# Patient Record
Sex: FEMALE | Race: WHITE | NOT HISPANIC OR LATINO | Employment: UNEMPLOYED | URBAN - METROPOLITAN AREA
[De-identification: names, ages, dates, MRNs, and addresses within clinical notes are randomized per-mention and may not be internally consistent; named-entity substitution may affect disease eponyms.]

---

## 2018-05-24 ENCOUNTER — APPOINTMENT (EMERGENCY)
Dept: CT IMAGING | Facility: HOSPITAL | Age: 33
End: 2018-05-24
Payer: COMMERCIAL

## 2018-05-24 ENCOUNTER — HOSPITAL ENCOUNTER (EMERGENCY)
Facility: HOSPITAL | Age: 33
Discharge: HOME | End: 2018-05-24
Attending: EMERGENCY MEDICINE | Admitting: EMERGENCY MEDICINE
Payer: COMMERCIAL

## 2018-05-24 VITALS
OXYGEN SATURATION: 98 % | WEIGHT: 170 LBS | TEMPERATURE: 97.4 F | BODY MASS INDEX: 30.12 KG/M2 | HEART RATE: 107 BPM | HEIGHT: 63 IN | SYSTOLIC BLOOD PRESSURE: 119 MMHG | DIASTOLIC BLOOD PRESSURE: 57 MMHG | RESPIRATION RATE: 18 BRPM

## 2018-05-24 DIAGNOSIS — S13.9XXA CERVICAL SPRAIN: ICD-10-CM

## 2018-05-24 DIAGNOSIS — S09.90XA: Primary | ICD-10-CM

## 2018-05-24 PROCEDURE — 99284 EMERGENCY DEPT VISIT MOD MDM: CPT

## 2018-05-24 PROCEDURE — 70450 CT HEAD/BRAIN W/O DYE: CPT

## 2018-05-24 PROCEDURE — 72125 CT NECK SPINE W/O DYE: CPT

## 2018-05-24 RX ORDER — CYCLOBENZAPRINE HCL 10 MG
10 TABLET ORAL 2 TIMES DAILY PRN
Qty: 20 TABLET | Refills: 0 | Status: SHIPPED | OUTPATIENT
Start: 2018-05-24

## 2018-05-24 RX ORDER — IBUPROFEN 600 MG/1
600 TABLET ORAL EVERY 8 HOURS PRN
Qty: 30 TABLET | Refills: 0 | Status: SHIPPED | OUTPATIENT
Start: 2018-05-24

## 2018-05-24 NOTE — DISCHARGE INSTRUCTIONS
Head Injury   WHAT YOU NEED TO KNOW:   A head injury is most often caused by a blow to the head  This may occur from a fall, bicycle injury, sports injury, being struck in the head, or a motor vehicle accident  DISCHARGE INSTRUCTIONS:   Call 911 or have someone else call for any of the following:   · You cannot be woken  · You have a seizure  · You stop responding to others or you faint  · You have blurry or double vision  · Your speech becomes slurred or confused  · You have arm or leg weakness, loss of feeling, or new problems with coordination  · Your pupils are larger than usual or one pupil is a different size than the other  · You have blood or clear fluid coming out of your ears or nose  Return to the emergency department if:   · You have repeated or forceful vomiting  · You feel confused  · Your headache gets worse or becomes severe  · You or someone caring for you notices that you are harder to wake than usual   Contact your healthcare provider if:   · Your symptoms last longer than 6 weeks after the injury  · You have questions or concerns about your condition or care  Medicines:   · Acetaminophen  decreases pain  Acetaminophen is available without a doctor's order  Ask how much to take and how often to take it  Follow directions  Acetaminophen can cause liver damage if not taken correctly  · Take your medicine as directed  Contact your healthcare provider if you think your medicine is not helping or if you have side effects  Tell him or her if you are allergic to any medicine  Keep a list of the medicines, vitamins, and herbs you take  Include the amounts, and when and why you take them  Bring the list or the pill bottles to follow-up visits  Carry your medicine list with you in case of an emergency  Self-care:   · Rest  or do quiet activities for 24 to 48 hours  Limit your time watching TV, using the computer, or doing tasks that require a lot of thinking  Slowly return to your normal activities as directed  Do not play sports or do activities that may cause you to get hit in the head  Ask your healthcare provider when you can return to sports  · Apply ice  on your head for 15 to 20 minutes every hour or as directed  Use an ice pack, or put crushed ice in a plastic bag  Cover it with a towel before you apply it to your skin  Ice helps prevent tissue damage and decreases swelling and pain  · Have someone stay with you for 24 hours  or as directed  This person can monitor you for complications and call [phone]  When you are awake the person should ask you a few questions to see if you are thinking clearly  An example would be to ask your name or your address  Prevent another head injury:   · Wear a helmet that fits properly  Do this when you play sports, or ride a bike, scooter, or skateboard  Helmets help decrease your risk of a serious head injury  Talk to your healthcare provider about other ways you can protect yourself if you play sports  · Wear your seat belt every time you are in a car  This helps to decrease your risk for a head injury if you are in a car accident  Follow up with your healthcare provider as directed:  Write down your questions so you remember to ask them during your visits  © 2017 [address] Information is for End User's use only and may not be sold, redistributed or otherwise used for commercial purposes  All illustrations and images included in CareNotes® are the copyrighted property of A D [employer] M [employer]  or Anthony Badillo  The above information is an  only  It is not intended as medical advice for individual conditions or treatments  Talk to your doctor, nurse or pharmacist before following any medical regimen to see if it is safe and effective for you    Motor Vehicle Accident   WHAT YOU NEED TO KNOW:   A motor vehicle accident (MVA) can cause injury from the impact or from being thrown around inside the car  You may have a bruise on your abdomen, chest, or neck from the seatbelt  You may also have pain in your face, neck, or back  You may have pain in your knee, hip, or thigh if your body hits the dash or the steering wheel  Muscle pain is commonly worse 1 to 2 days after an MVA  DISCHARGE INSTRUCTIONS:   Call 911 if:   · You have new or worsening chest pain or shortness of breath  Return to the emergency department if:   · You have new or worsening pain in your abdomen  · You have nausea and vomiting that does not get better  · You have a severe headache  · You have weakness, tingling, or numbness in your arms or legs  · You have new or worsening pain that makes it hard for you to move  Contact your healthcare provider if:   · You have pain that develops 2 to 3 days after the MVA  · You have questions or concerns about your condition or care  Medicines:   · Pain medicine: You may be given medicine to take away or decrease pain  Do not wait until the pain is severe before you take your medicine  · NSAIDs , such as ibuprofen, help decrease swelling, pain, and fever  This medicine is available with or without a doctor's order  NSAIDs can cause stomach bleeding or kidney problems in certain people  If you take blood thinner medicine, always ask if NSAIDs are safe for you  Always read the medicine label and follow directions  Do not give these medicines to children under 10months of age without direction from your child's healthcare provider  · Take your medicine as directed  Contact your healthcare provider if you think your medicine is not helping or if you have side effects  Tell him of her if you are allergic to any medicine  Keep a list of the medicines, vitamins, and herbs you take  Include the amounts, and when and why you take them  Bring the list or the pill bottles to follow-up visits  Carry your medicine list with you in case of an emergency    Follow up with your healthcare provider as directed:  Write down your questions so you remember to ask them during your visits  Safety tips:   · Always wear your seatbelt  This will help reduce serious injury from an MVA  · Use child safety seats  Your child needs to ride in a child safety seat made for his age, height, and weight  Ask your healthcare provider for more information about child safety seats  · Decrease speed  Drive the speed limit to reduce your risk for an MVA  · Do not drive if you are tired  You will react more slowly when you are tired  The slowed reaction time will increase your risk for an MVA  · Do not talk or text on your cell phone while you drive  You cannot respond fast enough in an emergency if you are distracted by texts or conversations  · Do not drink and drive  Use a designated   Call a taxi or get a ride home with someone if you have been drinking  Do not let your friends drive if they have been drinking alcohol  · Do not use illegal drugs and drive  You may be more tired or take risks that you normally would not take  Do not drive after you take prescription medicines that make you sleepy  Self-care:   · Use ice and heat  Ice helps decrease swelling and pain  Ice may also help prevent tissue damage  Use an ice pack, or put crushed ice in a plastic bag  Cover it with a towel and apply to your injured area for 15 to 20 minutes every hour, or as directed  After 2 days, use a heating pad on your injured area  Use heat as directed  · Gently stretch  Use gentle exercises to stretch your muscles after an MVA  Ask your healthcare provider for exercises you can do  © 2017 [address] Information is for End User's use only and may not be sold, redistributed or otherwise used for commercial purposes  All illustrations and images included in CareNotes® are the copyrighted property of [employer]  or Anthony Badillo    The above information is an  only  It is not intended as medical advice for individual conditions or treatments  Talk to your doctor, nurse or pharmacist before following any medical regimen to see if it is safe and effective for you  Cervical Spine Strain, Ambulatory Care   GENERAL INFORMATION:   A cervical spine strain  is when the tissues and muscles in your neck are stretched  It is called whiplash because it happens when your neck is quickly whipped forward and back  The pain may be sudden, or it may begin hours after the injury  Cervical spine strain is most commonly caused by a car accident or a contact sports injury  Seek immediate care for the following symptoms:   · Pain, numbness, tingling, or weakness in your arms, face, or scalp    · Shortness of breath, a hoarse voice, or problems swallowing  Treatment for a cervical spine strain  may include any of the following:  · Ibuprofen  decreases pain and swelling  It is available without a doctor's order  Ask how much to take and how often to take it  Follow directions  Ibuprofen can cause stomach bleeding and kidney damage if not taken correctly  · Acetaminophen  decreases pain  It is available without a doctor's order  Ask how much to take and how often to take it  Follow directions  Acetaminophen can cause liver damage if not taken correctly  · Pain medicine  may be prescribed for you  Ask for information on how to take this medicine safely  · Muscle relaxers  decrease pain and muscle spasms  Manage your symptoms:   · Wear a soft cervical collar to support your neck and hold it still  You may need to wear this collar for 7 to 10 days  By day 3, your healthcare provider may tell you to take the collar off for short periods of time  He may tell you to wear the collar less each day until you no longer need it  · Rest and avoid moving your neck as your injury heals  Rest will help decrease the risk of more damage to your neck  Gradually return to your normal activities  Stop if you have pain  Avoid activities that can cause more damage to your neck, such as heavy lifting or strenuous exercise  · Ice your neck to help decrease swelling and pain  Put crushed ice in a plastic bag  Cover the ice bag with a towel and place the ice on your neck for 15 to 20 minutes every hour  Do this for as many days as directed  · Sleep without a pillow to help decrease pain  Instead of a pillow, you may roll a small towel tightly and place it under your neck  · Go to physical therapy as directed by your healthcare provider  A physical therapist can teach you exercises to help improve movement and decrease pain  Physical therapy can also help improve strength and decrease your risk for loss of function  Follow up with your healthcare provider as directed:  Write down your questions so you remember to ask them during your visits  CARE AGREEMENT:   You have the right to help plan your care  Learn about your health condition and how it may be treated  Discuss treatment options with your caregivers to decide what care you want to receive  You always have the right to refuse treatment  The above information is an  only  It is not intended as medical advice for individual conditions or treatments  Talk to your doctor, nurse or pharmacist before following any medical regimen to see if it is safe and effective for you  © 2014 [address] is for End User's use only and may not be sold, redistributed or otherwise used for commercial purposes  All illustrations and images included in CareNotes® are the copyrighted property of [employer]  or Anthony Badillo

## 2018-06-06 ENCOUNTER — OFFICE VISIT (OUTPATIENT)
Dept: URGENT CARE | Facility: CLINIC | Age: 33
End: 2018-06-06
Payer: COMMERCIAL

## 2018-06-06 VITALS
BODY MASS INDEX: 32.25 KG/M2 | HEIGHT: 63 IN | TEMPERATURE: 98.4 F | WEIGHT: 182 LBS | RESPIRATION RATE: 16 BRPM | HEART RATE: 110 BPM | DIASTOLIC BLOOD PRESSURE: 68 MMHG | SYSTOLIC BLOOD PRESSURE: 122 MMHG | OXYGEN SATURATION: 98 %

## 2018-06-06 DIAGNOSIS — S13.4XXD WHIPLASH INJURY TO NECK, SUBSEQUENT ENCOUNTER: Primary | ICD-10-CM

## 2018-06-06 PROCEDURE — G0382 LEV 3 HOSP TYPE B ED VISIT: HCPCS | Performed by: PREVENTIVE MEDICINE

## 2018-06-06 RX ORDER — IBUPROFEN 600 MG/1
600 TABLET ORAL EVERY 8 HOURS PRN
Qty: 30 TABLET | Refills: 0 | Status: SHIPPED | OUTPATIENT
Start: 2018-06-06

## 2018-06-06 RX ORDER — OXYCODONE HYDROCHLORIDE 30 MG/1
15 TABLET ORAL EVERY 6 HOURS PRN
Qty: 20 TABLET | Refills: 0 | Status: SHIPPED | OUTPATIENT
Start: 2018-06-06 | End: 2018-06-06 | Stop reason: SDUPTHER

## 2018-06-06 RX ORDER — OXYCODONE HYDROCHLORIDE 30 MG/1
15 TABLET ORAL EVERY 6 HOURS PRN
Qty: 20 TABLET | Refills: 0 | Status: SHIPPED | OUTPATIENT
Start: 2018-06-06
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
## 2018-06-06 NOTE — PATIENT INSTRUCTIONS
Warm soaks to the area 3 or 4 times a day  Consider chiropractic care  Try the Flexeril 1/2 tablet at night  In addition, use a narcotic at night to help sleep and relieve the pain  Recheck 1 week if no improvement

## 2018-06-06 NOTE — LETTER
June 6, 2018     Patient: Johnson Coelho   YOB: 1985   Date of Visit: 6/6/2018       To Whom It May Concern: It is my medical opinion that Johnson Coelho may return to work on 6/11  If you have any questions or concerns, please don't hesitate to call           Sincerely,        Lacy Ramirez MD    CC: No Recipients

## 2018-06-20 ENCOUNTER — OFFICE VISIT (OUTPATIENT)
Dept: FAMILY MEDICINE | Facility: CLINIC | Age: 33
End: 2018-06-20
Payer: COMMERCIAL

## 2018-06-20 VITALS
WEIGHT: 186 LBS | HEART RATE: 80 BPM | OXYGEN SATURATION: 98 % | SYSTOLIC BLOOD PRESSURE: 120 MMHG | HEIGHT: 62 IN | DIASTOLIC BLOOD PRESSURE: 78 MMHG | BODY MASS INDEX: 34.23 KG/M2 | TEMPERATURE: 99.3 F

## 2018-06-20 DIAGNOSIS — G43.009 MIGRAINE WITHOUT AURA AND WITHOUT STATUS MIGRAINOSUS, NOT INTRACTABLE: ICD-10-CM
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
## 2018-06-20 DIAGNOSIS — S13.9XXA NECK SPRAIN, INITIAL ENCOUNTER: ICD-10-CM

## 2018-06-20 DIAGNOSIS — M54.2 NECK PAIN, ACUTE: Primary | ICD-10-CM

## 2018-06-20 PROCEDURE — 96372 THER/PROPH/DIAG INJ SC/IM: CPT | Performed by: NURSE PRACTITIONER

## 2018-06-20 PROCEDURE — 99203 OFFICE O/P NEW LOW 30 MIN: CPT | Mod: 25 | Performed by: NURSE PRACTITIONER

## 2018-06-20 RX ORDER — IBUPROFEN 600 MG/1
TABLET ORAL
Refills: 0 | COMMUNITY
Start: 2018-06-06

## 2018-06-20 RX ORDER — KETOROLAC TROMETHAMINE 30 MG/ML
60 INJECTION, SOLUTION INTRAMUSCULAR; INTRAVENOUS ONCE
Status: COMPLETED | OUTPATIENT
Start: 2018-06-20 | End: 2018-06-20

## 2018-06-20 RX ADMIN — KETOROLAC TROMETHAMINE 60 MG: 30 INJECTION, SOLUTION INTRAMUSCULAR; INTRAVENOUS at 13:59

## 2018-06-20 ASSESSMENT — ENCOUNTER SYMPTOMS
PSYCHIATRIC NEGATIVE: 1
FEVER: 0
FATIGUE: 0
NAUSEA: 0
EYES NEGATIVE: 1
SHORTNESS OF BREATH: 0
CHILLS: 0
NECK PAIN: 1
CARDIOVASCULAR NEGATIVE: 1
LIGHT-HEADEDNESS: 0
DIZZINESS: 0
COUGH: 0
DIARRHEA: 0
VOMITING: 0
WEAKNESS: 1
HEADACHES: 1
NECK STIFFNESS: 1
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
## 2018-06-22 ENCOUNTER — TELEPHONE (OUTPATIENT)
Dept: FAMILY MEDICINE | Facility: CLINIC | Age: 33
End: 2018-06-22

## 2018-06-22 NOTE — TELEPHONE ENCOUNTER
Per pt, she will decide where she wants to have mri and call me to let me know so I can obtain auth

## 2020-12-28 ENCOUNTER — OFFICE VISIT (OUTPATIENT)
Dept: FAMILY MEDICINE | Facility: CLINIC | Age: 35
End: 2020-12-28
Payer: COMMERCIAL

## 2020-12-28 VITALS
SYSTOLIC BLOOD PRESSURE: 132 MMHG | OXYGEN SATURATION: 98 % | BODY MASS INDEX: 33.57 KG/M2 | DIASTOLIC BLOOD PRESSURE: 82 MMHG | TEMPERATURE: 98.3 F | HEART RATE: 102 BPM | WEIGHT: 185 LBS | RESPIRATION RATE: 20 BRPM

## 2020-12-28 DIAGNOSIS — R05.9 COUGH: Primary | ICD-10-CM

## 2020-12-28 DIAGNOSIS — Z20.822 SUSPECTED COVID-19 VIRUS INFECTION: ICD-10-CM

## 2020-12-28 DIAGNOSIS — J35.8 TONSILLITH: ICD-10-CM

## 2020-12-28 DIAGNOSIS — R51.9 NONINTRACTABLE HEADACHE, UNSPECIFIED CHRONICITY PATTERN, UNSPECIFIED HEADACHE TYPE: ICD-10-CM

## 2020-12-28 PROCEDURE — 99213 OFFICE O/P EST LOW 20 MIN: CPT | Performed by: FAMILY MEDICINE

## 2020-12-28 RX ORDER — NORETHINDRONE 5 MG/1
5 TABLET ORAL DAILY
COMMUNITY
End: 2022-05-17

## 2020-12-28 ASSESSMENT — ENCOUNTER SYMPTOMS
HALLUCINATIONS: 0
WOUND: 0
NAUSEA: 0
SHORTNESS OF BREATH: 0
HEMATURIA: 0
JOINT SWELLING: 0
RHINORRHEA: 0
WEAKNESS: 0
FEVER: 0
CONFUSION: 0
COUGH: 0
DYSURIA: 0
CHILLS: 0
VOMITING: 0
NUMBNESS: 0
EYE DISCHARGE: 0
ABDOMINAL PAIN: 0

## 2020-12-28 NOTE — LETTER
December 28, 2020     Patient: Maye Ferris  YOB: 1985  Date of Visit: 12/28/2020    To Whom it May Concern:    Maye Ferris was seen in my clinic on 12/28/2020 at 1:40 pm. Please excuse Maye for her absence from work until her COVID test results come back.     If you have any questions or concerns, please don't hesitate to call.         Sincerely,         Belén Moreira,         CC: No Recipients

## 2020-12-28 NOTE — PROGRESS NOTES
"  Subjective      Patient ID: Maye Ferris is a 35 y.o. female.  1985      Started as a cold, then sinus infection, now throat pain and \"pus on tonsils\", found out on the way here that ex  had positive covid test, unsure of test date. Kids with her now, were with dad over [place] as well as in the house recently. No fever, +cough with sputum originally, now dry. No sob. Headache. No otc remedies.      The following have been reviewed and updated as appropriate in this visit:  Allergies  Meds  Problems       Review of Systems   Constitutional: Negative for chills and fever.   HENT: Negative for congestion and rhinorrhea.    Eyes: Negative for discharge and visual disturbance.   Respiratory: Negative for cough and shortness of breath.    Cardiovascular: Negative for chest pain and leg swelling.   Gastrointestinal: Negative for abdominal pain, nausea and vomiting.   Genitourinary: Negative for dysuria and hematuria.   Musculoskeletal: Negative for gait problem and joint swelling.   Skin: Negative for rash and wound.   Neurological: Negative for weakness and numbness.   Psychiatric/Behavioral: Negative for confusion and hallucinations.   All other systems reviewed and are negative.      Objective     Vitals:    12/28/20 1347   BP: 132/82   BP Location: Left upper arm   Patient Position: Sitting   Pulse: (!) 102   Resp: 20   Temp: 36.8 °C (98.3 °F)   TempSrc: Oral   SpO2: 98%   Weight: 83.9 kg (185 lb)     Body mass index is 33.57 kg/m².    Physical Exam  Vitals signs and nursing note reviewed.   Constitutional:       Appearance: She is well-developed.   HENT:      Head: Normocephalic and atraumatic.   Eyes:      Conjunctiva/sclera: Conjunctivae normal.   Neck:      Musculoskeletal: Normal range of motion and neck supple.   Cardiovascular:      Rate and Rhythm: Normal rate and regular rhythm.      Heart sounds: Normal heart sounds.   Pulmonary:      Effort: Pulmonary effort is normal.      Breath " sounds: Normal breath sounds.   Abdominal:      General: Bowel sounds are normal.      Palpations: Abdomen is soft.   Musculoskeletal: Normal range of motion.   Skin:     General: Skin is warm and dry.      Capillary Refill: Capillary refill takes less than 2 seconds.   Neurological:      Mental Status: She is alert and oriented to person, place, and time.   Psychiatric:         Behavior: Behavior normal.         Assessment/Plan   Diagnoses and all orders for this visit:    Cough (Primary)  Comments:  discussed isolation/quarantine precautions and gave [place] [facility] info sheet  Orders:  -     COVID-19 [facility] ([facility])    Nonintractable headache, unspecified chronicity pattern, unspecified headache type  -     COVID-19 [facility] ([facility])    Suspected COVID-19 virus infection    [place]

## 2020-12-28 NOTE — PATIENT INSTRUCTIONS
Patient Education     COVID-19 Frequently Asked Questions  COVID-19 (coronavirus disease) is an infection that is caused by a large family of viruses. Some viruses cause illness in people and others cause illness in animals like camels, cats, and bats. In some cases, the viruses that cause illness in animals can spread to humans.  Where did the coronavirus come from?  In December 2019, [place] told the World Health Organization (WHO) of several cases of lung disease (human respiratory illness). These cases were linked to an open seafood and livestock market in the city of [facility]. The link to the seafood and livestock market suggests that the virus may have spread from animals to humans. However, since that first outbreak in December, the virus has also been shown to spread from person to person.  What is the name of the disease and the virus?  Disease name  Early on, this disease was called novel coronavirus. This is because scientists determined that the disease was caused by a new (novel) respiratory virus. The World Health Organization (WHO) has now named the disease COVID-19, or coronavirus disease.  Virus name  The virus that causes the disease is called severe acute respiratory syndrome coronavirus 2 (SARS-CoV-2).  More information on disease and virus naming  World Health Organization (WHO): www.who.int/emergencies/diseases/novel-coronavirus-2019/technical-guidance/naming-the-coronavirus-disease-(covid-2019)-and-the-virus-that-causes-it  Who is at risk for complications from coronavirus disease?  Some people may be at higher risk for complications from coronavirus disease. This includes older adults and people who have chronic diseases, such as heart disease, diabetes, and lung disease.  If you are at higher risk for complications, take these extra precautions:  · Avoid close contact with people who are sick or have a fever or cough. Stay at least 3-6 ft (1-2 m) away from them, if possible.  · Wash your hands  often with soap and water for at least 20 seconds.  · Avoid touching your face, mouth, nose, or eyes.  · Keep supplies on hand at home, such as food, medicine, and cleaning supplies.  · Stay home as much as possible.  · Avoid social gatherings and travel.  How does coronavirus disease spread?  The virus that causes coronavirus disease spreads easily from person to person (is contagious). There are also cases of community-spread disease. This means the disease has spread to:  · People who have no known contact with other infected people.  · People who have not traveled to areas where there are known cases.  It appears to spread from one person to another through droplets from coughing or sneezing.  Can I get the virus from touching surfaces or objects?  There is still a lot that we do not know about the virus that causes coronavirus disease. Scientists are basing a lot of information on what they know about similar viruses, such as:  · Viruses cannot generally survive on surfaces for long. They need a human body (host) to survive.  · It is more likely that the virus is spread by close contact with people who are sick (direct contact), such as through:  ? Shaking hands or hugging.  ? Breathing in respiratory droplets that travel through the air. This can happen when an infected person coughs or sneezes on or near other people.  · It is less likely that the virus is spread when a person touches a surface or object that has the virus on it (indirect contact). The virus may be able to enter the body if the person touches a surface or object and then touches his or her face, eyes, nose, or mouth.  Can a person spread the virus without having symptoms of the disease?  It may be possible for the virus to spread before a person has symptoms of the disease, but this is most likely not the main way the virus is spreading. It is more likely for the virus to spread by being in close contact with people who are sick and breathing  in the respiratory droplets of a sick person's cough or sneeze.  What are the symptoms of coronavirus disease?  Symptoms vary from person to person and can range from mild to severe. Symptoms may include:  · Fever.  · Cough.  · Tiredness, weakness, or fatigue.  · Fast breathing or feeling short of breath.  These symptoms can appear anywhere from 2 to 14 days after you have been exposed to the virus. If you develop symptoms, call your health care provider. People with severe symptoms may need hospital care.  If I am exposed to the virus, how long does it take before symptoms start?  Symptoms of coronavirus disease may appear anywhere from 2 to 14 days after a person has been exposed to the virus. If you develop symptoms, call your health care provider.  Should I be tested for this virus?  Your health care provider will decide whether to test you based on your symptoms, history of exposure, and your risk factors.  How does a health care provider test for this virus?  Health care providers will collect samples to send for testing. Samples may include:  · Taking a swab of fluid from the nose.  · Taking fluid from the lungs by having you cough up mucus (sputum) into a sterile cup.  · Taking a blood sample.  · Taking a stool or urine sample.  Is there a treatment or vaccine for this virus?  Currently, there is no vaccine to prevent coronavirus disease. Also, there are no medicines like antibiotics or antivirals to treat the virus. A person who becomes sick is given supportive care, which means rest and fluids. A person may also relieve his or her symptoms by using over-the-counter medicines that treat sneezing, coughing, and runny nose. These are the same medicines that a person takes for the common cold.  If you develop symptoms, call your health care provider. People with severe symptoms may need hospital care.  What can I do to protect myself and my family from this virus?         You can protect yourself and your  family by taking the same actions that you would take to prevent the spread of other viruses. Take the following actions:  · Wash your hands often with soap and water for at least 20 seconds. If soap and water are not available, use alcohol-based hand .  · Avoid touching your face, mouth, nose, or eyes.  · Cough or sneeze into a tissue, sleeve, or elbow. Do not cough or sneeze into your hand or the air.  ? If you cough or sneeze into a tissue, throw it away immediately and wash your hands.  · Disinfect objects and surfaces that you frequently touch every day.  · Avoid close contact with people who are sick or have a fever or cough. Stay at least 3-6 ft (1-2 m) away from them, if possible.  · Stay home if you are sick, except to get medical care. Call your health care provider before you get medical care.  · Make sure your vaccines are up to date. Ask your health care provider what vaccines you need.  What should I do if I need to travel?  Follow travel recommendations from your local health authority, the CDC, and WHO.  Travel information and advice  · Centers for Disease Control and Prevention (CDC): www.cdc.gov/coronavirus/2019-ncov/travelers/index.html  · World Health Organization (WHO): www.who.int/emergencies/diseases/novel-coronavirus-2019/travel-advice  Know the risks and take action to protect your health  · You are at higher risk of getting coronavirus disease if you are traveling to areas with an outbreak or if you are exposed to travelers from areas with an outbreak.  · Wash your hands often and practice good hygiene to lower the risk of catching or spreading the virus.  What should I do if I am sick?  General instructions to stop the spread of infection  · Wash your hands often with soap and water for at least 20 seconds. If soap and water are not available, use alcohol-based hand .  · Cough or sneeze into a tissue, sleeve, or elbow. Do not cough or sneeze into your hand or the  air.  · If you cough or sneeze into a tissue, throw it away immediately and wash your hands.  · Stay home unless you must get medical care. Call your health care provider or local health authority before you get medical care.  · Avoid public areas. Do not take public transportation, if possible.  · If you can, wear a mask if you must go out of the house or if you are in close contact with someone who is not sick.  Keep your home clean  · Disinfect objects and surfaces that are frequently touched every day. This may include:  ? Counters and tables.  ? Doorknobs and light switches.  ? Sinks and faucets.  ? Electronics such as phones, remote controls, keyboards, computers, and tablets.  · Wash dishes in hot, soapy water or use a . Air-dry your dishes.  · Wash laundry in hot water.  Prevent infecting other household members  · Let healthy household members care for children and pets, if possible. If you have to care for children or pets, wash your hands often and wear a mask.  · Sleep in a different bedroom or bed, if possible.  · Do not share personal items, such as razors, toothbrushes, deodorant, campos, brushes, towels, and washcloths.  Where to find more information  Centers for Disease Control and Prevention (CDC)  · Information and news updates: www.cdc.gov/coronavirus/2019-ncov  World Health Organization (WHO)  · Information and news updates: www.who.int/emergencies/diseases/novel-coronavirus-2019  · Coronavirus health topic: www.who.int/health-topics/coronavirus  · Questions and answers on COVID-19: www.who.int/news-room/q-a-detail/l-j-rhrpftgcjmscd  · Global tracker: who.[employer]  American Academy of Pediatrics (AAP)  · Information for families: www.healthychildren.org/English/health-issues/conditions/chest-lungs/Pages/2019-Novel-Coronavirus.aspx  The coronavirus situation is changing rapidly. Check your local health authority website or the CDC and WHO websites for updates and news.  When should  I contact a health care provider?  · Contact your health care provider if you have symptoms of an infection, such as fever or cough, and you:  ? Have been near anyone who is known to have coronavirus disease.  ? Have come into contact with a person who is suspected to have coronavirus disease.  ? Have traveled outside of the country.  When should I get emergency medical care?  · Get help right away by calling your local emergency services (911 in the [place] if you have:  ? Trouble breathing.  ? Pain or pressure in your chest.  ? Confusion.  ? Blue-tinged lips and fingernails.  ? Difficulty waking from sleep.  ? Symptoms that get worse.  Let the emergency medical personnel know if you think you have coronavirus disease.  Summary  · A new respiratory virus is spreading from person to person and causing COVID-19 (coronavirus disease).  · The virus that causes COVID-19 appears to spread easily. It spreads from one person to another through droplets from coughing or sneezing.  · Older adults and those with chronic diseases are at higher risk of disease. If you are at higher risk for complications, take extra precautions.  · There is currently no vaccine to prevent coronavirus disease. There are no medicines, such as antibiotics or antivirals, to treat the virus.  · You can protect yourself and your family by washing your hands often, avoiding touching your face, and covering your coughs and sneezes.  This information is not intended to replace advice given to you by your health care provider. Make sure you discuss any questions you have with your health care provider.  Document Released: 04/14/2020 Document Revised: 04/14/2020 Document Reviewed: 04/14/2020  Elsevier Patient Education © 2020 [employer] Inc.       For symptom control, you may use:  Cough- cough syrup (robitussin), lozenges  Sore throat- lozenges, chloraseptic spray, motrin, tylenol  Fever/body aches/headaches- motrin, tylenol  Nasal/sinus congestion-  decongestants (pseudofed, Phenylephrine), flonase, afrin    You may have COVID 19. Until your test results come back, please isolate and quarantine. This means stay home. Stay away from family members, and if you must be in the same room, try to stay at least 6ft apart and wear a mask. Wash you hands often, don't touch your face, and cough into your elbow. Visit cdc.gov for more information.

## 2020-12-30 ENCOUNTER — TELEPHONE (OUTPATIENT)
Dept: FAMILY MEDICINE | Facility: CLINIC | Age: 35
End: 2020-12-30

## 2020-12-30 NOTE — TELEPHONE ENCOUNTER
Pt called about quarantining guidelines for her and her children until CVOID results are back. Needs letter stating our/CDC recommendations due to custody issues/visitation.    Needs emailed to her at [email or web address]    Letter completed and emailed

## 2020-12-30 NOTE — LETTER
December 30, 2020     Patient: Maye Ferris  YOB: 1985  Date of Visit: 12/30/2020    To Whom it May Concern:    Maye Ferris was seen in my clinic on 12/28/20 for COVID like symptoms, on this date a PCR nasal COVID-19 test was performed . She and all those in her household, are required to quarantine per our office instruction and per CDC guidelines, until her test results are received from the lab.     Further guidelines state, if her test comes back POSITIVE, the above patient must continue to quarantine for 10 days from the date the positive test was performed. All other members of her household will need to quarantine for 14 days from the date the test was performed.     If you have any questions or concerns, please don't hesitate to call.         Sincerely,         Belén Moreira,         CC: No Recipients

## 2020-12-31 LAB — SARS-COV-2 RNA RESP QL NAA+PROBE: NOT DETECTED

## 2021-02-11 ENCOUNTER — TELEMEDICINE (OUTPATIENT)
Dept: FAMILY MEDICINE | Facility: CLINIC | Age: 36
End: 2021-02-11
Payer: COMMERCIAL

## 2021-02-11 DIAGNOSIS — Z72.0 TOBACCO ABUSE: Primary | ICD-10-CM

## 2021-02-11 PROCEDURE — 99212 OFFICE O/P EST SF 10 MIN: CPT | Mod: 95 | Performed by: FAMILY MEDICINE

## 2021-02-11 RX ORDER — BUPROPION HYDROCHLORIDE 150 MG/1
150 TABLET, EXTENDED RELEASE ORAL 2 TIMES DAILY
Qty: 180 TABLET | Refills: 0 | Status: SHIPPED | OUTPATIENT
Start: 2021-02-11 | End: 2021-05-06

## 2021-02-11 ASSESSMENT — ENCOUNTER SYMPTOMS
CONFUSION: 0
WEAKNESS: 0
CHILLS: 0
DYSURIA: 0
NAUSEA: 0
JOINT SWELLING: 0
HALLUCINATIONS: 0
HEMATURIA: 0
ABDOMINAL PAIN: 0
WOUND: 0
COUGH: 0
NUMBNESS: 0
VOMITING: 0
RHINORRHEA: 0
EYE DISCHARGE: 0
SHORTNESS OF BREATH: 0
FEVER: 0

## 2021-02-11 NOTE — PROGRESS NOTES
Verification of Patient Location:  The patient affirms they are currently located in the following state: [place]    Request for Consent:    Audio Only Encounter   You and I are about to have a telemedicine check-in or visit. This is allowed because you have requested it. This telemedicine visit will be billed to your health insurance or you, if you are self-insured. You understand you will be responsible for any copayments or coinsurances that apply to your telemedicine visit. Before starting our telemedicine visit, I am required to get your consent for this virtual check-in or visit by telemedicine. Do you consent?    Patient Response to Request for Consent:  Yes      Visit Documentation:  Subjective     Patient ID: Maye Ferris is a 35 y.o. female.  1985      Maye Ferris presents as telemed visit during global COVID 19 pandemic to discuss quitting smoking. She currently smokes about 1 ppd of light cigarettes. She has tried to quit before with nicotine patches and gum but has been unsuccessful. She is interested in trying medication.       The following have been reviewed and updated as appropriate in this visit:  Tobacco  Allergies  Meds  Problems  Med Hx  Surg Hx  Fam Hx       Review of Systems   Constitutional: Negative for chills and fever.   HENT: Negative for congestion and rhinorrhea.    Eyes: Negative for discharge and visual disturbance.   Respiratory: Negative for cough and shortness of breath.    Cardiovascular: Negative for chest pain and leg swelling.   Gastrointestinal: Negative for abdominal pain, nausea and vomiting.   Genitourinary: Negative for dysuria and hematuria.   Musculoskeletal: Negative for gait problem and joint swelling.   Skin: Negative for rash and wound.   Neurological: Negative for weakness and numbness.   Psychiatric/Behavioral: Negative for confusion and hallucinations.   All other systems reviewed and are negative.        Assessment/Plan   Diagnoses and  all orders for this visit:    Tobacco abuse (Primary)  Comments:  discussed risks and benefits of medication with specific instructions on how to take it and how to taper cigarettes. Patient to call with questions or concerns  Orders:  -     buPROPion SR (WELLBUTRIN SR) 150 mg 12 hr tablet; Take 1 tablet (150 mg total) by mouth 2 (two) times a day. Start with 1 tab qd x3d, then increase to tab BID. Aim to start quitting after 2 weeks and try to taper by half each week after.        Time Spent:  I spent 15 minutes on this date of service performing the following activities: obtaining history, entering orders, documenting, preparing for visit and providing counseling and education.    Return in about 12 weeks (around 5/6/2021) for Recheck- smoking cessation, can be telemed.    Belén Moreira DO

## 2021-06-16 ENCOUNTER — TELEPHONE (OUTPATIENT)
Dept: FAMILY MEDICINE | Facility: CLINIC | Age: 36
End: 2021-06-16

## 2021-06-16 DIAGNOSIS — Z00.00 ROUTINE PHYSICAL EXAMINATION: Primary | ICD-10-CM

## 2021-06-16 DIAGNOSIS — Z00.00 PHYSICAL EXAM: Primary | ICD-10-CM

## 2021-06-16 NOTE — TELEPHONE ENCOUNTER
Pt called, states she haven't had complete BW done in so long, she wants to get BW and also schedule PE appt with Dr guevara.

## 2021-07-16 LAB
ALBUMIN SERPL-MCNC: 4.7 G/DL (ref 3.6–5.1)
ALBUMIN/GLOB SERPL: 2.2 (CALC) (ref 1–2.5)
ALP SERPL-CCNC: 67 U/L (ref 31–125)
ALT SERPL-CCNC: 16 U/L (ref 6–29)
AST SERPL-CCNC: 17 U/L (ref 10–30)
BASOPHILS # BLD AUTO: 40 CELLS/UL (ref 0–200)
BASOPHILS NFR BLD AUTO: 0.5 %
BILIRUB SERPL-MCNC: 0.6 MG/DL (ref 0.2–1.2)
BUN SERPL-MCNC: 9 MG/DL (ref 7–25)
BUN/CREAT SERPL: NORMAL (CALC) (ref 6–22)
CALCIUM SERPL-MCNC: 9.3 MG/DL (ref 8.6–10.2)
CHLORIDE SERPL-SCNC: 106 MMOL/L (ref 98–110)
CHOLEST SERPL-MCNC: 168 MG/DL
CHOLEST/HDLC SERPL: 3 (CALC)
CO2 SERPL-SCNC: 25 MMOL/L (ref 20–32)
CREAT SERPL-MCNC: 0.73 MG/DL (ref 0.5–1.1)
EOSINOPHIL # BLD AUTO: 312 CELLS/UL (ref 15–500)
EOSINOPHIL NFR BLD AUTO: 3.9 %
ERYTHROCYTE [DISTWIDTH] IN BLOOD BY AUTOMATED COUNT: 11.5 % (ref 11–15)
GLOBULIN SER CALC-MCNC: 2.1 G/DL (CALC) (ref 1.9–3.7)
GLUCOSE SERPL-MCNC: 83 MG/DL (ref 65–99)
HCT VFR BLD AUTO: 42.2 % (ref 35–45)
HDLC SERPL-MCNC: 56 MG/DL
HGB BLD-MCNC: 13.8 G/DL (ref 11.7–15.5)
LDLC SERPL CALC-MCNC: 92 MG/DL (CALC)
LYMPHOCYTES # BLD AUTO: 2328 CELLS/UL (ref 850–3900)
LYMPHOCYTES NFR BLD AUTO: 29.1 %
MCH RBC QN AUTO: 30.3 PG (ref 27–33)
MCHC RBC AUTO-ENTMCNC: 32.7 G/DL (ref 32–36)
MCV RBC AUTO: 92.5 FL (ref 80–100)
MONOCYTES # BLD AUTO: 536 CELLS/UL (ref 200–950)
MONOCYTES NFR BLD AUTO: 6.7 %
NEUTROPHILS # BLD AUTO: 4784 CELLS/UL (ref 1500–7800)
NEUTROPHILS NFR BLD AUTO: 59.8 %
NONHDLC SERPL-MCNC: 112 MG/DL (CALC)
PLATELET # BLD AUTO: 271 THOUSAND/UL (ref 140–400)
PMV BLD REES-ECKER: 9.8 FL (ref 7.5–12.5)
POTASSIUM SERPL-SCNC: 4.2 MMOL/L (ref 3.5–5.3)
PROT SERPL-MCNC: 6.8 G/DL (ref 6.1–8.1)
QUEST EGFR AFRICAN AMERICAN: 121 ML/MIN/1.73M2
QUEST EGFR NON-AFR. AMERICAN: 104 ML/MIN/1.73M2
RBC # BLD AUTO: 4.56 MILLION/UL (ref 3.8–5.1)
SODIUM SERPL-SCNC: 139 MMOL/L (ref 135–146)
TRIGL SERPL-MCNC: 106 MG/DL
TSH SERPL-ACNC: 2.67 MIU/L
WBC # BLD AUTO: 8 THOUSAND/UL (ref 3.8–10.8)

## 2021-07-19 ENCOUNTER — TELEPHONE (OUTPATIENT)
Dept: FAMILY MEDICINE | Facility: CLINIC | Age: 36
End: 2021-07-19

## 2021-09-30 ENCOUNTER — OFFICE VISIT (OUTPATIENT)
Dept: FAMILY MEDICINE | Facility: CLINIC | Age: 36
End: 2021-09-30
Payer: COMMERCIAL

## 2021-09-30 VITALS
HEIGHT: 63 IN | TEMPERATURE: 98.1 F | HEART RATE: 79 BPM | BODY MASS INDEX: 32.43 KG/M2 | OXYGEN SATURATION: 99 % | SYSTOLIC BLOOD PRESSURE: 110 MMHG | WEIGHT: 183 LBS | DIASTOLIC BLOOD PRESSURE: 70 MMHG

## 2021-09-30 DIAGNOSIS — M79.89 PALPABLE MASS OF SOFT TISSUE OF THIGH: ICD-10-CM

## 2021-09-30 DIAGNOSIS — F41.1 GAD (GENERALIZED ANXIETY DISORDER): Primary | ICD-10-CM

## 2021-09-30 DIAGNOSIS — Z23 NEED FOR PROPHYLACTIC VACCINATION AND INOCULATION AGAINST INFLUENZA: ICD-10-CM

## 2021-09-30 PROCEDURE — 90686 IIV4 VACC NO PRSV 0.5 ML IM: CPT | Performed by: FAMILY MEDICINE

## 2021-09-30 PROCEDURE — 90471 IMMUNIZATION ADMIN: CPT | Performed by: FAMILY MEDICINE

## 2021-09-30 PROCEDURE — 3008F BODY MASS INDEX DOCD: CPT | Performed by: FAMILY MEDICINE

## 2021-09-30 PROCEDURE — 99214 OFFICE O/P EST MOD 30 MIN: CPT | Mod: 25 | Performed by: FAMILY MEDICINE

## 2021-09-30 RX ORDER — SERTRALINE HYDROCHLORIDE 50 MG/1
50 TABLET, FILM COATED ORAL DAILY
Qty: 30 TABLET | Refills: 1 | Status: SHIPPED | OUTPATIENT
Start: 2021-09-30 | End: 2021-11-11

## 2021-09-30 RX ORDER — HYDROXYZINE PAMOATE 25 MG/1
25 CAPSULE ORAL 3 TIMES DAILY PRN
Qty: 30 CAPSULE | Refills: 1 | Status: SHIPPED | OUTPATIENT
Start: 2021-09-30 | End: 2021-11-11 | Stop reason: SDUPTHER

## 2021-09-30 NOTE — PROGRESS NOTES
"  Subjective      Patient ID: Maye Ferris is a 36 y.o. female.  1985      HPI     Maye Ferris presents for follow up with labs. Also to discuss anxiety and has question about lump on thigh    Anxiety- states that her anxiety is so bad that she \"can't cope with it\". Having difficulty with the \"world opening back up\".     TACOS 7  Over the past 2 weeks, how often have you been bothered by:  1. Feeling nervous, anxious, or on edge? 3- nearly every day  2. Not being able to stop or control worrying?  3- nearly every day  3. Worrying too much about different things? 3- nearly every day  4. Having trouble relaxing? 3- nearly every day  5.  Being so restless that it is hard to sit still? 3- nearly every day  6. Being easily annoyed or irritable?   3- nearly every day  7. Afraid, as if something awful might happen? 3- nearly every day    GAD7 score = TACOS >8 positive      The following have been reviewed and updated as appropriate in this visit:       Review of Systems   All other systems reviewed and are negative.      Objective     Vitals:    09/30/21 0950   BP: 110/70   BP Location: Right upper arm   Patient Position: Sitting   Pulse: 79   Temp: 36.7 °C (98.1 °F)   TempSrc: Oral   SpO2: 99%   Weight: 83 kg (183 lb)   Height: 1.6 m (5' 3\")     Body mass index is 32.42 kg/m².    Physical Exam  Vitals and nursing note reviewed.   Constitutional:       Appearance: She is well-developed.   HENT:      Head: Normocephalic and atraumatic.   Eyes:      Conjunctiva/sclera: Conjunctivae normal.   Cardiovascular:      Rate and Rhythm: Normal rate and regular rhythm.      Heart sounds: Normal heart sounds.   Pulmonary:      Effort: Pulmonary effort is normal.      Breath sounds: Normal breath sounds.   Abdominal:      General: Bowel sounds are normal.      Palpations: Abdomen is soft.   Musculoskeletal:         General: Normal range of motion.      Cervical back: Normal range of motion and neck supple.        " Legs:    Skin:     General: Skin is warm and dry.      Capillary Refill: Capillary refill takes less than 2 seconds.   Neurological:      Mental Status: She is alert and oriented to person, place, and time.   Psychiatric:         Mood and Affect: Mood is anxious.         Behavior: Behavior normal.         Assessment/Plan   Diagnoses and all orders for this visit:    TACOS (generalized anxiety disorder) (Primary)  Comments:  recommend adding zoloft and vistaril, continue wellbutrin, start therapy/cbt  Orders:  -     sertraline (ZOLOFT) 50 mg tablet; Take 1 tablet (50 mg total) by mouth daily.  -     hydrOXYzine (VISTARIL) 25 mg capsule; Take 1 capsule (25 mg total) by mouth 3 (three) times a day as needed for anxiety (or insomnia).  -     Ambulatory referral to Psychology; Future    Palpable mass of soft tissue of thigh  Comments:  probably lipoma, borders feel slightly irregular  Orders:  -     ULTRASOUND SOFT TISSUE EXTREMITY; Future    Need for prophylactic vaccination and inoculation against influenza  -     Influenza vaccine quadrivalent preservative free 6 mon and older IM    Return in about 6 weeks (around 11/11/2021).    Belén Moreira DO

## 2021-09-30 NOTE — Clinical Note
I must have told you or shane wrong- this lady should follow up in 6 weeks, not 2. Will you please call to reschedule. Sorry!

## 2021-10-19 ENCOUNTER — TELEMEDICINE (OUTPATIENT)
Dept: FAMILY MEDICINE | Facility: CLINIC | Age: 36
End: 2021-10-19
Payer: COMMERCIAL

## 2021-10-19 DIAGNOSIS — F41.8 OTHER SPECIFIED ANXIETY DISORDERS: Primary | ICD-10-CM

## 2021-10-19 ASSESSMENT — COGNITIVE AND FUNCTIONAL STATUS - GENERAL
DELUSIONS: NONE OR AGE APPROPRIATE
ORIENTATION: FULLY ORIENTED
PSYCHOMOTOR FUNCTIONING: WNL
CONCENTRATION: WNL
AFFECT: FULL RANGE
APPETITE: INCREASED;DECREASED
APPEARANCE: WELL GROOMED
THOUGHT_PROCESS: WNL
REMOTE MEMORY: WNL
THOUGHT_CONTENT: APPROPRIATE
INSIGHT: INTACT
EYE_CONTACT: WNL
AROUSAL LEVEL: ALERT
IMPULSE CONTROL: INTACT
SLEEP_WAKE_CYCLE: DECREASED;EARLY MORNING WAKING
PERCEPTUAL FUNCTION: NORMAL
SPEECH: REGULAR
ATTENTION: WNL
MOOD: ANXIOUS
RECENT MEMORY: WNL

## 2021-10-19 NOTE — PATIENT INSTRUCTIONS
Create a list of goals you would like to work on. Have the list ready at our next session so we can review it together.
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
## 2021-10-21 DIAGNOSIS — Z72.0 TOBACCO ABUSE: ICD-10-CM

## 2021-10-21 RX ORDER — BUPROPION HYDROCHLORIDE 150 MG/1
TABLET, EXTENDED RELEASE ORAL
Qty: 180 TABLET | Refills: 0 | Status: SHIPPED | OUTPATIENT
Start: 2021-10-21 | End: 2021-11-11 | Stop reason: DRUGHIGH

## 2021-10-21 NOTE — TELEPHONE ENCOUNTER
Medicine Refill Request    [facility] RX wellbutrin     Last Office Visit: 9/30/2021  Last Telemedicine Visit: 10/19/2021 Rolanda Dangelo, LPC    Next Office Visit: 11/2/2021  Next Telemedicine Visit: Visit date not found         Current Outpatient Medications:   •  buPROPion SR (WELLBUTRIN SR) 150 mg 12 hr tablet, TAKE 1 TABLET BY MOUTH TWICE A DAY, Disp: 180 tablet, Rfl: 0  •  LARA 0.35 mg tablet, Take 0.035 mg by mouth., Disp: , Rfl:   •  fluticasone propionate (FLONASE) 50 mcg/actuation nasal spray, Administer into each nostril as needed., Disp: , Rfl:   •  hydrOXYzine (VISTARIL) 25 mg capsule, Take 1 capsule (25 mg total) by mouth 3 (three) times a day as needed for anxiety (or insomnia)., Disp: 30 capsule, Rfl: 1  •  ibuprofen (MOTRIN) 600 mg tablet, TAKE 1 TABLET (600MG TOTAL) BY MOUTH EVERY 8 HOURS AS NEEDED FOR MILD PAIN, Disp: , Rfl: 0  •  norethindrone (AYGESTIN) 5 mg tablet, Take 5 mg by mouth daily., Disp: , Rfl:   •  sertraline (ZOLOFT) 50 mg tablet, Take 1 tablet (50 mg total) by mouth daily., Disp: 30 tablet, Rfl: 1      BP Readings from Last 3 Encounters:   09/30/21 110/70   12/28/20 132/82   06/20/18 120/78       Recent Lab results:  Lab Results   Component Value Date    CHOL 168 07/16/2021   ,   Lab Results   Component Value Date    HDL 56 07/16/2021   ,   Lab Results   Component Value Date    LDLCALC 92 07/16/2021   ,   Lab Results   Component Value Date    TRIG 106 07/16/2021        Lab Results   Component Value Date    GLUCOSE 83 07/16/2021   , No results found for: HGBA1C      Lab Results   Component Value Date    CREATININE 0.73 07/16/2021       Lab Results   Component Value Date    TSH 2.67 07/16/2021

## 2021-11-02 NOTE — PROGRESS NOTES
Integrated Behavioral Health Follow-up Tele-health Visit Note  Visit Type Performed: Audio and Video Encounter   Ger, my name is Rolanda Dangelo LPC.  Before we proceed, can you please verify your identification by telling me your full name and date of birth?  Can you tell me who is in the room with you?    You and I are about to have a telemedicine check-in or visit because you have requested it.  This is a live video-conference.  I am a real person, speaking to you in real time.  There is no one else with me on the video-conference.  However, when we use ([employer], [employer], etc) it is important for you to know that the video-conference may not be secure or private.  I am not recording this conversation and I am asking you not to record it.  This telemedicine visit will be billed to your health insurance or you, if you are self-insured.  You understand you will be responsible for any copayments or coinsurances that apply to your telemedicine visit.  Communication platform used for this encounter:  [employer] Video Visit (no Zoom)     Before starting our telemedicine visit, I am required to get your consent for this virtual check-in or visit by telemedicine. Do you consent?    Visit number: 2   Duration: 30 min    Maye Ferris 1985 a 36 y.o.female, who was contacted today for a behavioral health visit.  Clinician confirmed identification of patient by name and birthdate, provider name, location of patient and clinician, and callback number in case disconnected.    Verification of Patient Location:  The patient affirms they are currently located in the following state: [place]  This visit was conducted via telehealth/telephone in lieu of an in-person visit due to precautions related to the COVID-19 pandemic.    Request for Consent:  You and I are about to have a tele-health check-in or visit. This is allowed because you are already a patient of our [facility] practice, and you have requested it.  This  tele-health visit will be billed to your health insurance or you, if you are self-insured. You understand you will be responsible for any copayments or coinsurances that apply to your tele-health visit.  Before starting our telemedicine visit, I am required to get your consent for this virtual check-in or visit by tele-health . Do you consent?    Patient Response to Request for Consent: Yes    SUBJECTIVE     Reason for visit: follow-up for anxiety, depression, and insomnia     Symptoms  Depression symptoms: none reported   Anxiety symptoms: some worry    OBJECTIVE     Mental Status Evaluation  Patient's mood and affect were consistent with the context, and consistent with their baseline: Yes   Comments:  Calm and pleasant, awake and alert; oriented to person, place, and time      Suicidal Ideation/Homicidal Ideation Risk Assessment: not assessed. If not assessed, reason:  Assessment of SI/HI is not indicated at this time, based on prior assessment (pt denied SI/HI at previous session, pt presents with no significant risk factors, and pt has protective factors)    Risk Factors: Loss (relational, social, work, or financial)  Protective factors: Connectedness to individuals, family, community, and social institutions    Estimate of Current Risk: Minimal risk    Plan for Safety-   N/A: risk is assessed to be minimal, therefore developing a safety plan is not indicated at this time.      Interventions  Cognitive Behavior Training  Goal Setting  Psychoeducation  We discussed pt's past week and recent symptomology. Discussed updates with medication. Discussed tx goals. Provided psychoeducation of CBT. Assisted pt in practicing identifying mood changes and related automatic thoughts and set related homework assignment.       Psychotropic medications: Not assessed at this visit  Current Outpatient Medications   Medication Sig Dispense Refill   • buPROPion  mg 12 hr tablet TAKE 1 TABLET BY MOUTH TWICE A  tablet 0  "  • LARA 0.35 mg tablet Take 0.035 mg by mouth.     • fluticasone propionate (FLONASE) 50 mcg/actuation nasal spray Administer into each nostril as needed.     • hydrOXYzine (VISTARIL) 25 mg capsule Take 1 capsule (25 mg total) by mouth 3 (three) times a day as needed for anxiety (or insomnia). 30 capsule 1   • ibuprofen (MOTRIN) 600 mg tablet TAKE 1 TABLET (600MG TOTAL) BY MOUTH EVERY 8 HOURS AS NEEDED FOR MILD PAIN  0   • norethindrone (AYGESTIN) 5 mg tablet Take 5 mg by mouth daily.     • sertraline (ZOLOFT) 50 mg tablet Take 1 tablet (50 mg total) by mouth daily. 30 tablet 1     No current facility-administered medications for this visit.       ASSESSMENT       Progress  Patient's progress toward their goals is generally showing no change   Patient's symptomology is unchanged   Everything has been \"good and normal.\" There were a situations that were a \"bit chaotic\" with her kids. Her goals are to learn positive coping strategies to deal with future stressors. Expressed understanding of CBT concepts and was responsive to suggestions.       PLAN     Goals     • Improve coping skills     • Reduce anxiety/depression      As measured by TACOS-7 and PHQ-9            Recommendations  Individual Therapy, 1-4 times monthly      Next visit plan:  Rvw pt progress, rvw pt homework, begin discussion of cognitive restructuring techniques      I spent 30 minutes on this date of service performing the following activities: providing counseling and education.  "

## 2021-11-09 ENCOUNTER — TELEMEDICINE (OUTPATIENT)
Dept: FAMILY MEDICINE | Facility: CLINIC | Age: 36
End: 2021-11-09
Payer: COMMERCIAL

## 2021-11-09 DIAGNOSIS — F41.8 OTHER SPECIFIED ANXIETY DISORDERS: Primary | ICD-10-CM

## 2021-11-09 NOTE — PATIENT INSTRUCTIONS
Journal once daily. Write about times when you noticed a change in your mood (ie: happy, sad, anxious, frustrated, etc.). Identify the mood, then write one or two sentences about what was going through your mind at that time. Have this journal available during our next visit for us to review together.

## 2021-11-11 ENCOUNTER — OFFICE VISIT (OUTPATIENT)
Dept: FAMILY MEDICINE | Facility: CLINIC | Age: 36
End: 2021-11-11
Payer: COMMERCIAL

## 2021-11-11 VITALS
HEIGHT: 63 IN | HEART RATE: 82 BPM | DIASTOLIC BLOOD PRESSURE: 85 MMHG | RESPIRATION RATE: 16 BRPM | SYSTOLIC BLOOD PRESSURE: 110 MMHG | BODY MASS INDEX: 33.7 KG/M2 | TEMPERATURE: 98.2 F | WEIGHT: 190.2 LBS | OXYGEN SATURATION: 99 %

## 2021-11-11 DIAGNOSIS — F17.210 SMOKES 11 TO 20 CIGARETTES PER DAY: ICD-10-CM

## 2021-11-11 DIAGNOSIS — F41.1 GAD (GENERALIZED ANXIETY DISORDER): Primary | ICD-10-CM

## 2021-11-11 PROCEDURE — 99213 OFFICE O/P EST LOW 20 MIN: CPT | Performed by: FAMILY MEDICINE

## 2021-11-11 PROCEDURE — 3008F BODY MASS INDEX DOCD: CPT | Performed by: FAMILY MEDICINE

## 2021-11-11 RX ORDER — SERTRALINE HYDROCHLORIDE 100 MG/1
100 TABLET, FILM COATED ORAL DAILY
Qty: 30 TABLET | Refills: 1 | Status: SHIPPED | OUTPATIENT
Start: 2021-11-11 | End: 2021-12-30

## 2021-11-11 RX ORDER — IBUPROFEN 200 MG
1 TABLET ORAL
Qty: 42 PATCH | Refills: 0 | Status: SHIPPED | OUTPATIENT
Start: 2021-11-11 | End: 2021-12-30

## 2021-11-11 RX ORDER — BUPROPION HYDROCHLORIDE 300 MG/1
300 TABLET ORAL DAILY
Qty: 90 TABLET | Refills: 1 | Status: SHIPPED | OUTPATIENT
Start: 2021-11-11 | End: 2022-04-19 | Stop reason: DRUGHIGH

## 2021-11-11 RX ORDER — HYDROXYZINE PAMOATE 25 MG/1
CAPSULE ORAL
Qty: 60 CAPSULE | Refills: 1 | Status: SHIPPED | OUTPATIENT
Start: 2021-11-11 | End: 2022-04-19

## 2021-11-11 NOTE — PATIENT INSTRUCTIONS
Patient Education   Quitting Smoking  You will learn how quitting smoking can help prevent a variety of health problems and improve your health.  To view the content, go to this web address:  [email or web address]    This video will  on: 2023. If you need access to this video following this date, please reach out to the healthcare provider who assigned it to you.  This information is not intended to replace advice given to you by your health care provider. Make sure you discuss any questions you have with your health care provider.  Elsevier Patient Education ©  [employer]

## 2021-11-11 NOTE — PROGRESS NOTES
"  Subjective      Patient ID: Maye Ferris is a 36 y.o. female.  1985      HPI   Maye Ferris presents for follow up from last visit on 9/30/21- added zoloft 50mg daily and vistaril 25mg prn to her wellbutrin and advised CBT.    Had second visit with therapy yesterday, feels it is going well. States vistaril helps with sleeping but not really with anxiety.     The following have been reviewed and updated as appropriate in this visit:  Allergies  Meds  Problems       Review of Systems   All other systems reviewed and are negative.      Objective     Vitals:    11/11/21 0951   BP: 110/85   BP Location: Left upper arm   Patient Position: Sitting   Pulse: 82   Resp: 16   Temp: 36.8 °C (98.2 °F)   TempSrc: Oral   SpO2: 99%   Weight: 86.3 kg (190 lb 3.2 oz)   Height: 1.6 m (5' 3\")     Body mass index is 33.69 kg/m².    Physical Exam  Vitals and nursing note reviewed.   Constitutional:       Appearance: Normal appearance.   HENT:      Head: Normocephalic and atraumatic.      Nose: Nose normal.   Eyes:      Extraocular Movements: Extraocular movements intact.      Conjunctiva/sclera: Conjunctivae normal.   Cardiovascular:      Rate and Rhythm: Normal rate.   Pulmonary:      Effort: Pulmonary effort is normal.   Musculoskeletal:         General: Normal range of motion.      Cervical back: Normal range of motion.   Skin:     Coloration: Skin is not jaundiced.      Findings: No rash.   Neurological:      General: No focal deficit present.      Mental Status: She is alert. Mental status is at baseline.   Psychiatric:         Mood and Affect: Mood normal.         Behavior: Behavior normal.         Assessment/Plan   Diagnoses and all orders for this visit:    TACOS (generalized anxiety disorder) (Primary)  Assessment & Plan:  Change wellbutrin to xl formula per patient request, increase zolort to 100mg daily and can increase vistaril to 50mg prn anxiety (max dose 100mg per dose)    Orders:  -     sertraline (ZOLOFT) " 100 mg tablet; Take 1 tablet (100 mg total) by mouth daily.  -     buPROPion XL (WELLBUTRIN XL) 300 mg 24 hr tablet; Take 1 tablet (300 mg total) by mouth daily.  -     hydrOXYzine 25 mg capsule; Take 1-2 capsules prn insomnia/anxiety (no more than 6 per day)    Smokes 11 to 20 cigarettes per day  Assessment & Plan:  Will prescribe nicotine patches as patient states insurance covers them. Plan for 21mg for 6 weeks, 14 for 2 weeks, 7mg for 2 weeks then stop    Orders:  -     nicotine (NICODERM CQ) 21 mg/24 hr; Place 1 patch on the skin daily.  Return in about 6 weeks (around 12/23/2021).    Belén Moreira DO

## 2021-11-12 PROBLEM — F41.1 GAD (GENERALIZED ANXIETY DISORDER): Status: ACTIVE | Noted: 2021-11-12

## 2021-11-12 PROBLEM — F17.210 SMOKES 11 TO 20 CIGARETTES PER DAY: Status: ACTIVE | Noted: 2021-11-12

## 2021-11-12 NOTE — ASSESSMENT & PLAN NOTE
Will prescribe nicotine patches as patient states insurance covers them. Plan for 21mg for 6 weeks, 14 for 2 weeks, 7mg for 2 weeks then stop

## 2021-11-12 NOTE — ASSESSMENT & PLAN NOTE
Change wellbutrin to xl formula per patient request, increase zolort to 100mg daily and can increase vistaril to 50mg prn anxiety (max dose 100mg per dose)

## 2021-11-30 NOTE — PROGRESS NOTES
Integrated Behavioral Health Follow-up Tele-health Visit Note  Visit Type Performed: Audio and Video Encounter   Ger, my name is Rolanda Dangelo LPC.  Before we proceed, can you please verify your identification by telling me your full name and date of birth?  Can you tell me who is in the room with you?    You and I are about to have a telemedicine check-in or visit because you have requested it.  This is a live video-conference.  I am a real person, speaking to you in real time.  There is no one else with me on the video-conference.  However, when we use ([employer], [employer], etc) it is important for you to know that the video-conference may not be secure or private.  I am not recording this conversation and I am asking you not to record it.  This telemedicine visit will be billed to your health insurance or you, if you are self-insured.  You understand you will be responsible for any copayments or coinsurances that apply to your telemedicine visit.  Communication platform used for this encounter:  [email or web address] Video Visit (no Zoom)     Before starting our telemedicine visit, I am required to get your consent for this virtual check-in or visit by telemedicine. Do you consent?    Visit number: 3   Duration: 30 min    Maye Ferris 1985 a 36 y.o.female, who was contacted today for a behavioral health visit.  Clinician confirmed identification of patient by name and birthdate, provider name, location of patient and clinician, and callback number in case disconnected.    Verification of Patient Location:  The patient affirms they are currently located in the following state: [place]  This visit was conducted via telehealth/telephone in lieu of an in-person visit due to precautions related to the COVID-19 pandemic.    Request for Consent:  You and I are about to have a tele-health check-in or visit. This is allowed because you are already a patient of our [facility] practice, and you have requested it.  This  tele-health visit will be billed to your health insurance or you, if you are self-insured. You understand you will be responsible for any copayments or coinsurances that apply to your tele-health visit.  Before starting our telemedicine visit, I am required to get your consent for this virtual check-in or visit by tele-health . Do you consent?    Patient Response to Request for Consent: Yes      SUBJECTIVE     Reason for visit: follow-up for anxiety, depression, and insomnia      Symptoms  Depression symptoms: none reported   Anxiety symptoms: some worry    OBJECTIVE     Mental Status Evaluation  Patient's mood and affect were consistent with the context, and consistent with their baseline: Yes   Comments:  Calm and pleasant, awake and alert; oriented to person, place, and time        Suicidal Ideation/Homicidal Ideation Risk Assessment: not assessed. If not assessed, reason:  Assessment of SI/HI is not indicated at this time, based on prior assessment (pt denied SI/HI at previous session, pt presents with no significant risk factors, and pt has protective factors)  Risk Factors: Loss (relational, social, work, or financial)  Protective factors: Connectedness to individuals, family, community, and social institutions  Estimate of Current Risk: Minimal risk     Plan for Safety-   N/A: risk is assessed to be minimal, therefore developing a safety plan is not indicated at this time.      Interventions  Cognitive Behavior Training  Psychoeducation  We discussed pt's past weeks and recent stressors. We discussed pt's homework to identify mood changes and associated negative automatic thoughts. Provided pyschoeducation of cognitive restructuring technique socratic questioning.      Psychotropic medications: no known adherence challenges, effective, feels like baseline anxiety is lower   Current Outpatient Medications   Medication Sig Dispense Refill   • buPROPion XL (WELLBUTRIN XL) 300 mg 24 hr tablet Take 1 tablet (300  "mg total) by mouth daily. 90 tablet 1   • LARA 0.35 mg tablet Take 0.035 mg by mouth.     • fluticasone propionate (FLONASE) 50 mcg/actuation nasal spray Administer into each nostril as needed.     • hydrOXYzine 25 mg capsule Take 1-2 capsules prn insomnia/anxiety (no more than 6 per day) 60 capsule 1   • ibuprofen (MOTRIN) 600 mg tablet TAKE 1 TABLET (600MG TOTAL) BY MOUTH EVERY 8 HOURS AS NEEDED FOR MILD PAIN  0   • nicotine (NICODERM CQ) 21 mg/24 hr Place 1 patch on the skin daily. 42 patch 0   • norethindrone (AYGESTIN) 5 mg tablet Take 5 mg by mouth daily.     • sertraline (ZOLOFT) 100 mg tablet Take 1 tablet (100 mg total) by mouth daily. 30 tablet 1     No current facility-administered medications for this visit.       ASSESSMENT       Progress  Patient's progress toward their goals is generally improving   Patient's symptomology is gradually improving   Feeling a little sad because her daughter can't come home for the holidays. Today is day 3 without cigarette. Used the patch for one day and has been keeping herself busy. Increased Zoloft dose and has started to see the difference. She feels so much better, her baseline anxiety is lower, and feels more in control of her emotions. Has been focusing on identifying the thoughts behind her emotions. Trying to give the thought perspective and asks herself is it \"worth 5 minutes or 5 years?\" and if she is upset by someone else she asks herself \"where are you?\" Noticed that this has helped improve her communication with her partner.         PLAN     Goals     • Improve coping skills     • Reduce anxiety/depression      As measured by TACOS-7 and PHQ-9            Recommendations  Individual Therapy, 1-4 times monthly     Next visit plan:  Rvw pt progress, rvw pt homework, continue discussion of cognitive restructuring techniques     I spent 30 minutes on this date of service performing the following activities: providing counseling and education.  "

## 2021-12-07 ENCOUNTER — TELEMEDICINE (OUTPATIENT)
Dept: FAMILY MEDICINE | Facility: CLINIC | Age: 36
End: 2021-12-07
Payer: COMMERCIAL

## 2021-12-07 DIAGNOSIS — F41.8 OTHER SPECIFIED ANXIETY DISORDERS: Primary | ICD-10-CM

## 2021-12-07 NOTE — PATIENT INSTRUCTIONS
Journal once daily   1. Write about times when you notice a change in your mood, identify the mood  2. Identify the thoughts going through your head  3. Go through the questions below  4. Develop a more fair or realistic alternate thought     How to Question Stressful, Angry, Anxious, or Depressed Thinking     1. Am I upsetting myself unnecessarily? How can I see this another way?  2. Is my thinking working for or against me? How could I view this in a less upsetting way?  3. What am I demanding must happen? What do I want or prefer, rather than need?  4. Am I making something too terrible? Is it that awful? What would be so terrible about that?  5. Am I labelling a person? What is the action I don’t like?  6. What is untrue about my thoughts? How can I stick to the facts?  7. Am I using extreme or black-and-white language? What words would be more accurate?  8. Am I fortune-telling or mind-reading in a way that gets me upset or unhappy? What are the odds or chances that it will really turn out the way I’m thinking or imaging?  9. What are my options in this situation? How would I like to respond?  10. What are more moderate, helpful, or realistic statements to replace the upsetting ones?  11. Have I had any experiences that show that this thought might not be completely true?  12. If my best friend or someone I loved had this thought, what would I tell them?  13. If someone I care about knew I was thinking this thought, what would they say to me?  14. Are there strengths in me or positives in the situation that I am ignoring?  15. When I am not feeling this way, do I think about this situation differently? How?  16. Have I been in this type of situation before? What happened? What have I learned from prior experiences that could help me now?  17. Five years from now, if I look back on this situation, will I look at it any differently? Will I focus on any different part of my experience?  18. Am I blaming myself for  something over which I do not have complete control?

## 2021-12-10 NOTE — PROGRESS NOTES
Integrated Behavioral Health Follow-up Tele-health Visit Note  Visit Type Performed: Audio and Video Encounter   Ger, my name is Rolanda Dangelo LPC.  Before we proceed, can you please verify your identification by telling me your full name and date of birth?  Can you tell me who is in the room with you?    You and I are about to have a telemedicine check-in or visit because you have requested it.  This is a live video-conference.  I am a real person, speaking to you in real time.  There is no one else with me on the video-conference.  However, when we use ([employer], [employer], etc) it is important for you to know that the video-conference may not be secure or private.  I am not recording this conversation and I am asking you not to record it.  This telemedicine visit will be billed to your health insurance or you, if you are self-insured.  You understand you will be responsible for any copayments or coinsurances that apply to your telemedicine visit.  Communication platform used for this encounter:  [employer] Video Visit (no Zoom)     Before starting our telemedicine visit, I am required to get your consent for this virtual check-in or visit by telemedicine. Do you consent?    Visit number: 4   Duration: 30 min    Maye Ferris 1985 a 36 y.o.female, who was contacted today for a behavioral health visit.  Clinician confirmed identification of patient by name and birthdate, provider name, location of patient and clinician, and callback number in case disconnected.    Verification of Patient Location:  The patient affirms they are currently located in the following state: [place]  This visit was conducted via telehealth/telephone in lieu of an in-person visit due to precautions related to the COVID-19 pandemic.    Request for Consent:  You and I are about to have a tele-health check-in or visit. This is allowed because you are already a patient of our [facility] practice, and you have requested it.  This  tele-health visit will be billed to your health insurance or you, if you are self-insured. You understand you will be responsible for any copayments or coinsurances that apply to your tele-health visit.  Before starting our telemedicine visit, I am required to get your consent for this virtual check-in or visit by tele-health . Do you consent?    Patient Response to Request for Consent: Yes      SUBJECTIVE     Reason for visit: follow-up for anxiety, depression, and insomnia     Symptoms  Depression symptoms: anxiety  Anxiety symptoms: moderate anxiety/worry      OBJECTIVE     Mental Status Evaluation  Patient's mood and affect were consistent with the context, and consistent with their baseline: Yes   Comments:  Calm and pleasant, awake and alert; oriented to person, place, and time        Suicidal Ideation/Homicidal Ideation Risk Assessment: not assessed. If not assessed, reason:  Assessment of SI/HI is not indicated at this time, based on prior assessment (pt denied SI/HI at previous session, pt presents with no significant risk factors, and pt has protective factors)  Risk Factors: Loss (relational, social, work, or financial)  Protective factors: Connectedness to individuals, family, community, and social institutions  Estimate of Current Risk: Minimal risk     Plan for Safety-   N/A: risk is assessed to be minimal, therefore developing a safety plan is not indicated at this time.      Interventions  Anxiety Reduction Techniques, Cognitive Behavior Therapy and Psychoeducation  We discussed pt's past weeks and recent stressors. Reviewed relationship between thoughts and emotions. Provided psychoeducation of additional cognitive restructuring technique, examine the evidence. Assisted pt in applying concepts to recent stressful situations. Discussed new homework assignment.      Psychotropic medications: Not assessed at this visit  Current Outpatient Medications   Medication Sig Dispense Refill   • buPROPion XL  (WELLBUTRIN XL) 300 mg 24 hr tablet Take 1 tablet (300 mg total) by mouth daily. 90 tablet 1   • LARA 0.35 mg tablet Take 0.035 mg by mouth.     • fluticasone propionate (FLONASE) 50 mcg/actuation nasal spray Administer into each nostril as needed.     • hydrOXYzine 25 mg capsule Take 1-2 capsules prn insomnia/anxiety (no more than 6 per day) 60 capsule 1   • ibuprofen (MOTRIN) 600 mg tablet TAKE 1 TABLET (600MG TOTAL) BY MOUTH EVERY 8 HOURS AS NEEDED FOR MILD PAIN  0   • nicotine (NICODERM CQ) 21 mg/24 hr Place 1 patch on the skin daily. 42 patch 0   • norethindrone (AYGESTIN) 5 mg tablet Take 5 mg by mouth daily.     • sertraline (ZOLOFT) 100 mg tablet Take 1 tablet (100 mg total) by mouth daily. 30 tablet 1     No current facility-administered medications for this visit.       ASSESSMENT       Progress  Patient's progress toward their goals is generally showing no change   Patient's symptomology is unchanged   Her anxiety has remained high. It feels consistently elevated most of the time. She has tried to rationalize her anxiety but it has not been effective. Expressed understanding of the role of automatic thoughts and of the examining the evidence. Agreed to practice for homework.     PLAN     Goals     • Improve coping skills     • Reduce anxiety/depression      As measured by TACOS-7 and PHQ-9            Recommendations  Individual Therapy, 1-4 times monthly     Next visit plan:  Rvw pt progress, rvw pt homework, continue discussion of cognitive restructuring techniques     I spent 30 minutes on this date of service performing the following activities: providing counseling and education.

## 2021-12-21 ENCOUNTER — TELEMEDICINE (OUTPATIENT)
Dept: FAMILY MEDICINE | Facility: CLINIC | Age: 36
End: 2021-12-21
Payer: COMMERCIAL

## 2021-12-21 DIAGNOSIS — F41.1 GAD (GENERALIZED ANXIETY DISORDER): Primary | ICD-10-CM

## 2021-12-21 NOTE — PROGRESS NOTES
Integrated Behavioral Health Follow-up Tele-health Visit Note  Visit Type Performed: Audio and Video Encounter   eGr, my name is Rolanda Dangelo LPC.  Before we proceed, can you please verify your identification by telling me your full name and date of birth?  Can you tell me who is in the room with you?    You and I are about to have a telemedicine check-in or visit because you have requested it.  This is a live video-conference.  I am a real person, speaking to you in real time.  There is no one else with me on the video-conference.  However, when we use ([employer], [employer], etc) it is important for you to know that the video-conference may not be secure or private.  I am not recording this conversation and I am asking you not to record it.  This telemedicine visit will be billed to your health insurance or you, if you are self-insured.  You understand you will be responsible for any copayments or coinsurances that apply to your telemedicine visit.  Communication platform used for this encounter:  [employer] Video Visit (no Zoom)     Before starting our telemedicine visit, I am required to get your consent for this virtual check-in or visit by telemedicine. Do you consent?    Visit number: 5   Duration: 30 min    Maye Ferris 1985 a 36 y.o.female, who was contacted today for a behavioral health visit.  Clinician confirmed identification of patient by name and birthdate, provider name, location of patient and clinician, and callback number in case disconnected.    Verification of Patient Location:  The patient affirms they are currently located in the following state: [place]  This visit was conducted via telehealth/telephone in lieu of an in-person visit due to precautions related to the COVID-19 pandemic.    Request for Consent:  You and I are about to have a tele-health check-in or visit. This is allowed because you are already a patient of our [facility] practice, and you have requested it.  This  tele-health visit will be billed to your health insurance or you, if you are self-insured. You understand you will be responsible for any copayments or coinsurances that apply to your tele-health visit.  Before starting our telemedicine visit, I am required to get your consent for this virtual check-in or visit by tele-health . Do you consent?    Patient Response to Request for Consent: Yes      SUBJECTIVE     Reason for visit: follow-up for anxiety, depression, and insomnia     Symptoms  Depression symptoms: anxiety  Anxiety symptoms: moderate anxiety/worry      OBJECTIVE     Mental Status Evaluation  Patient's mood and affect were consistent with the context, and consistent with their baseline: Yes   Comments:  Calm and pleasant, awake and alert; oriented to person, place, and time        Suicidal Ideation/Homicidal Ideation Risk Assessment: not assessed. If not assessed, reason:  Assessment of SI/HI is not indicated at this time, based on prior assessment (pt denied SI/HI at previous session, pt presents with no significant risk factors, and pt has protective factors)  Risk Factors: Loss (relational, social, work, or financial)  Protective factors: Connectedness to individuals, family, community, and social institutions  Estimate of Current Risk: Minimal risk     Plan for Safety-   N/A: risk is assessed to be minimal, therefore developing a safety plan is not indicated at this time.      Interventions  Cognitive Behavior Therapy  We discussed pt's past weeks and recent stressors. We reviewed pt's homework to practice cognitive restructuring techniques and their impact on her mood and belief in her automatic thoughts. Continued discussion of how to best apply techniques to various situations. Encouraged her to use her journal for the challenging situations.     Psychotropic medications:  Not assessed at this visit  Current Outpatient Medications   Medication Sig Dispense Refill   • buPROPion XL (WELLBUTRIN XL) 300  mg 24 hr tablet Take 1 tablet (300 mg total) by mouth daily. 90 tablet 1   • LARA 0.35 mg tablet Take 0.035 mg by mouth.     • fluticasone propionate (FLONASE) 50 mcg/actuation nasal spray Administer into each nostril as needed.     • hydrOXYzine 25 mg capsule Take 1-2 capsules prn insomnia/anxiety (no more than 6 per day) 60 capsule 1   • ibuprofen (MOTRIN) 600 mg tablet TAKE 1 TABLET (600MG TOTAL) BY MOUTH EVERY 8 HOURS AS NEEDED FOR MILD PAIN  0   • nicotine (NICODERM CQ) 21 mg/24 hr Place 1 patch on the skin daily. 42 patch 0   • norethindrone (AYGESTIN) 5 mg tablet Take 5 mg by mouth daily.     • sertraline (ZOLOFT) 100 mg tablet Take 1 tablet (100 mg total) by mouth daily. 30 tablet 1     No current facility-administered medications for this visit.       ASSESSMENT       Progress  Patient's progress toward their goals is generally improving   Patient's symptomology is gradually improving   Her holiday season was pretty stressful. She has been cigarette free for about a month. It has felt really good to have that level of self control. She has continued to practice the cognitive restructuring techniques and finds them effective. Finding some situations are easier than others to reframe her negative automatic thoughts. Agreed to journal about the challenging situations.     PLAN     Goals     • Improve coping skills     • Reduce anxiety/depression      As measured by TACOS-7 and PHQ-9            Recommendations  Individual Therapy, 1-4 times monthly     Next visit plan:  Rvw pt progress, rvw pt homework, continue discussion of cognitive restructuring techniques     I spent 30 minutes on this date of service performing the following activities: providing counseling and education.

## 2021-12-21 NOTE — PATIENT INSTRUCTIONS
Journal once daily   1. Write about times when you notice a change in your mood, identify the mood  2. Identify the thoughts going through your head  3. Examine the evidence (https://www.[employer].com/worksheets/gsmvsrx-lecehexx-yb-trial.pdf)  4. Reframe the thought in a more fair/rational way    
peripheral

## 2021-12-22 ENCOUNTER — TELEMEDICINE (OUTPATIENT)
Dept: FAMILY MEDICINE | Facility: CLINIC | Age: 36
End: 2021-12-22
Payer: COMMERCIAL

## 2021-12-22 DIAGNOSIS — F41.1 GAD (GENERALIZED ANXIETY DISORDER): Primary | ICD-10-CM

## 2021-12-22 DIAGNOSIS — Z87.891 FORMER SMOKER: ICD-10-CM

## 2021-12-22 PROCEDURE — 99213 OFFICE O/P EST LOW 20 MIN: CPT | Mod: 95 | Performed by: FAMILY MEDICINE

## 2021-12-22 NOTE — ASSESSMENT & PLAN NOTE
Discussed options of changing medication, increasing current medication or augmenting with additional medication. Patient would like to increase current medication at this time. Will increase Zoloft to 200 mg daily. Can also consider increasing Wellbutrin to 450 in the future.

## 2022-01-04 ENCOUNTER — TELEMEDICINE (OUTPATIENT)
Dept: FAMILY MEDICINE | Facility: CLINIC | Age: 37
End: 2022-01-04
Payer: COMMERCIAL

## 2022-01-04 DIAGNOSIS — F41.1 GAD (GENERALIZED ANXIETY DISORDER): Primary | ICD-10-CM

## 2022-01-04 NOTE — PATIENT INSTRUCTIONS
Journal once daily   1. Write about times when you notice a change in your mood, identify the mood  2. Identify the thoughts going through your head  3. Examine the evidence (https://www.[employer].com/worksheets/xmwbapi-skqwlcuu-ek-trial.pdf) or go through the questions below  4. Develop a more fair or realistic alternate thought       How to Question Stressful, Angry, Anxious, or Depressed Thinking     1. Am I upsetting myself unnecessarily? How can I see this another way?  2. Is my thinking working for or against me? How could I view this in a less upsetting way?  3. What am I demanding must happen? What do I want or prefer, rather than need?  4. Am I making something too terrible? Is it that awful? What would be so terrible about that?  5. Am I labelling a person? What is the action I don’t like?  6. What is untrue about my thoughts? How can I stick to the facts?  7. Am I using extreme or black-and-white language? What words would be more accurate?  8. Am I fortune-telling or mind-reading in a way that gets me upset or unhappy? What are the odds or chances that it will really turn out the way I’m thinking or imaging?  9. What are my options in this situation? How would I like to respond?  10. What are more moderate, helpful, or realistic statements to replace the upsetting ones?  11. Have I had any experiences that show that this thought might not be completely true?  12. If my best friend or someone I loved had this thought, what would I tell them?  13. If someone I care about knew I was thinking this thought, what would they say to me?  14. Are there strengths in me or positives in the situation that I am ignoring?  15. When I am not feeling this way, do I think about this situation differently? How?  16. Have I been in this type of situation before? What happened? What have I learned from prior experiences that could help me now?  17. Five years from now, if I look back on this situation, will I look at it  any differently? Will I focus on any different part of my experience?  18. Am I blaming myself for something over which I do not have complete control?

## 2022-03-17 DIAGNOSIS — F17.210 SMOKES 11 TO 20 CIGARETTES PER DAY: ICD-10-CM

## 2022-03-17 NOTE — TELEPHONE ENCOUNTER
Medicine Refill Request    Last Office: 11/11/2021   Last Consult Visit: Visit date not found  Last Telemedicine Visit: 1/4/2022 Rolanda Dangelo, LPC    Next Appointment: 4/19/2022      Current Outpatient Medications:   •  buPROPion XL (WELLBUTRIN XL) 300 mg 24 hr tablet, Take 1 tablet (300 mg total) by mouth daily., Disp: 90 tablet, Rfl: 1  •  LARA 0.35 mg tablet, Take 0.035 mg by mouth., Disp: , Rfl:   •  fluticasone propionate (FLONASE) 50 mcg/actuation nasal spray, Administer into each nostril as needed., Disp: , Rfl:   •  hydrOXYzine 25 mg capsule, Take 1-2 capsules prn insomnia/anxiety (no more than 6 per day), Disp: 60 capsule, Rfl: 1  •  ibuprofen (MOTRIN) 600 mg tablet, TAKE 1 TABLET (600MG TOTAL) BY MOUTH EVERY 8 HOURS AS NEEDED FOR MILD PAIN, Disp: , Rfl: 0  •  nicotine 21 mg/24 hr, APPLY 1 PATCH ON SKIN EVERY DAY, Disp: 28 patch, Rfl: 1  •  norethindrone (AYGESTIN) 5 mg tablet, Take 5 mg by mouth daily., Disp: , Rfl:   •  sertraline (ZOLOFT) 100 mg tablet, TAKE 1 TABLET BY MOUTH EVERY DAY, Disp: 30 tablet, Rfl: 2      BP Readings from Last 3 Encounters:   11/11/21 110/85   09/30/21 110/70   12/28/20 132/82       Recent Lab results:  Lab Results   Component Value Date    CHOL 168 07/16/2021   ,   Lab Results   Component Value Date    HDL 56 07/16/2021   ,   Lab Results   Component Value Date    LDLCALC 92 07/16/2021   ,   Lab Results   Component Value Date    TRIG 106 07/16/2021        Lab Results   Component Value Date    GLUCOSE 83 07/16/2021   , No results found for: HGBA1C      Lab Results   Component Value Date    CREATININE 0.73 07/16/2021       Lab Results   Component Value Date    TSH 2.67 07/16/2021

## 2022-03-18 RX ORDER — IBUPROFEN 200 MG
TABLET ORAL
Qty: 28 PATCH | Refills: 1 | Status: SHIPPED | OUTPATIENT
Start: 2022-03-18 | End: 2022-04-19

## 2022-04-19 ENCOUNTER — TELEMEDICINE (OUTPATIENT)
Dept: FAMILY MEDICINE | Facility: CLINIC | Age: 37
End: 2022-04-19
Payer: COMMERCIAL

## 2022-04-19 DIAGNOSIS — F41.1 GAD (GENERALIZED ANXIETY DISORDER): Primary | ICD-10-CM

## 2022-04-19 PROCEDURE — 99213 OFFICE O/P EST LOW 20 MIN: CPT | Mod: 95 | Performed by: FAMILY MEDICINE

## 2022-04-19 RX ORDER — BUPROPION HYDROCHLORIDE 150 MG/1
150 TABLET ORAL DAILY
Qty: 90 TABLET | Refills: 3 | Status: SHIPPED | OUTPATIENT
Start: 2022-04-19 | End: 2022-05-17 | Stop reason: SINTOL

## 2022-04-19 RX ORDER — CITALOPRAM 40 MG/1
40 TABLET, FILM COATED ORAL DAILY
Qty: 30 TABLET | Refills: 1 | Status: SHIPPED | OUTPATIENT
Start: 2022-04-19 | End: 2022-05-17 | Stop reason: SDUPTHER

## 2022-04-19 NOTE — PROGRESS NOTES
Verification of Patient Location:  The patient affirms they are currently located in the following state: [place]    Request for Consent:    Audio and Video Encounter   Hello, my name is Belén Moreira DO.  Before we proceed, can you please verify your identification by telling me your full name and date of birth?  Can you tell me who is in the room with you?    You and I are about to have a telemedicine check-in or visit because you have requested it.  This is a live video-conference.  I am a real person, speaking to you in real time.  There is no one else with me on the video-conference.  However, when we use ([employer], [employer], etc) it is important for you to know that the video-conference may not be secure or private.  I am not recording this conversation and I am asking you not to record it.  This telemedicine visit will be billed to your health insurance or you, if you are self-insured.  You understand you will be responsible for any copayments or coinsurances that apply to your telemedicine visit.  Communication platform used for this encounter:  [employer] Video Visit (no Zoom)     Before starting our telemedicine visit, I am required to get your consent for this virtual check-in or visit by telemedicine. Do you consent?      Patient Response to Request for Consent:  Yes      Visit Documentation:  Subjective     Patient ID: Maye Ferris is a 36 y.o. female.  1985      HPI   Maye Ferris presents via video visit to discuss anxiety and smoking    Down to 10 cigarettes per week. Not using nicotine patch    Feels that anxiety is out of control. States baseline seems up all the time. Some panic- 2 attacks last week but thinks it may be related to the anxiety.     The following have been reviewed and updated as appropriate in this visit:          Review of Systems      Assessment/Plan   Diagnoses and all orders for this visit:    TACOS (generalized anxiety disorder) (Primary)  Assessment &  Plan:  Will change zoloft to celexa and decrease wellbutrin to 150 as this may be exacerbating anxiety. Will try to stop completely next visit if still having anxiety    Orders:  -     citalopram (CeleXA) 40 mg tablet; Take 1 tablet (40 mg total) by mouth daily.  -     buPROPion XL (WELLBUTRIN XL) 150 mg 24 hr tablet; Take 1 tablet (150 mg total) by mouth daily.      Time Spent:  I spent 22 minutes on this date of service performing the following activities: obtaining history, entering orders, documenting, preparing for visit and providing counseling and education.    Return in about 4 weeks (around 5/17/2022) for celexa, decreased wellbutrin.    Belén Moreira, DO

## 2022-04-19 NOTE — ASSESSMENT & PLAN NOTE
Will change zoloft to celexa and decrease wellbutrin to 150 as this may be exacerbating anxiety. Will try to stop completely next visit if still having anxiety

## 2022-04-21 ENCOUNTER — TELEPHONE (OUTPATIENT)
Dept: FAMILY MEDICINE | Facility: CLINIC | Age: 37
End: 2022-04-21
Payer: COMMERCIAL

## 2022-04-22 NOTE — TELEPHONE ENCOUNTER
Fax received Citalopram approved till 05/22/2022, pt can not be on both Sertraline and Citalopram at same time. Next month if pt doesn't take Sertraline he can refill her Celexa at local pharm w/o prior auth.

## 2022-05-17 ENCOUNTER — TELEPHONE (OUTPATIENT)
Dept: FAMILY MEDICINE | Facility: CLINIC | Age: 37
End: 2022-05-17

## 2022-05-17 ENCOUNTER — OFFICE VISIT (OUTPATIENT)
Dept: FAMILY MEDICINE | Facility: CLINIC | Age: 37
End: 2022-05-17
Payer: COMMERCIAL

## 2022-05-17 VITALS
HEIGHT: 63 IN | WEIGHT: 192 LBS | OXYGEN SATURATION: 98 % | BODY MASS INDEX: 34.02 KG/M2 | TEMPERATURE: 98.2 F | DIASTOLIC BLOOD PRESSURE: 72 MMHG | RESPIRATION RATE: 16 BRPM | HEART RATE: 98 BPM | SYSTOLIC BLOOD PRESSURE: 120 MMHG

## 2022-05-17 DIAGNOSIS — F99 INSOMNIA DUE TO OTHER MENTAL DISORDER: ICD-10-CM

## 2022-05-17 DIAGNOSIS — F41.1 GAD (GENERALIZED ANXIETY DISORDER): Primary | ICD-10-CM

## 2022-05-17 DIAGNOSIS — Z11.1 SCREENING FOR TUBERCULOSIS: ICD-10-CM

## 2022-05-17 DIAGNOSIS — F51.05 INSOMNIA DUE TO OTHER MENTAL DISORDER: ICD-10-CM

## 2022-05-17 PROCEDURE — 99214 OFFICE O/P EST MOD 30 MIN: CPT | Mod: 25 | Performed by: FAMILY MEDICINE

## 2022-05-17 PROCEDURE — 3008F BODY MASS INDEX DOCD: CPT | Performed by: FAMILY MEDICINE

## 2022-05-17 PROCEDURE — 86580 TB INTRADERMAL TEST: CPT | Performed by: FAMILY MEDICINE

## 2022-05-17 RX ORDER — CITALOPRAM 40 MG/1
40 TABLET, FILM COATED ORAL DAILY
Qty: 30 TABLET | Refills: 1 | Status: SHIPPED | OUTPATIENT
Start: 2022-05-17 | End: 2023-04-25 | Stop reason: ALTCHOICE

## 2022-05-17 RX ORDER — DOXEPIN 3 MG/1
3 TABLET, FILM COATED ORAL NIGHTLY
Qty: 30 TABLET | Refills: 1 | Status: SHIPPED | OUTPATIENT
Start: 2022-05-17 | End: 2022-05-23 | Stop reason: CLARIF

## 2022-05-17 NOTE — ASSESSMENT & PLAN NOTE
Try doxepin low dose. Not a great candidate for melatonin as it is more of a sleep maintenance issue. Discussed sleep hygiene and gave handout

## 2022-05-17 NOTE — PROGRESS NOTES
"  Subjective      Patient ID: Maye Ferris is a 36 y.o. female.  1985      HPI   Maye Ferris presents to discuss mood and sleep.     Stopped the wellbutrin (sweating and maybe worsens the anxiety). Never started the celexa due to insurance issues    Sleep- sleep initiation is good, wakes up within a couple hours, takes at least 30-40, feels this repeats at least 10 times. Sometimes will get up for the day around 5.     The following have been reviewed and updated as appropriate in this visit:   Allergies  Meds  Problems         Review of Systems   All other systems reviewed and are negative.      Objective     Vitals:    05/17/22 0951   BP: 120/72   BP Location: Left upper arm   Patient Position: Sitting   Pulse: 98   Resp: 16   Temp: 36.8 °C (98.2 °F)   TempSrc: Oral   SpO2: 98%   Weight: 87.1 kg (192 lb)   Height: 1.6 m (5' 3\")     Body mass index is 34.01 kg/m².    Physical Exam  Vitals and nursing note reviewed.   Constitutional:       Appearance: She is well-developed.   HENT:      Head: Normocephalic and atraumatic.   Eyes:      Conjunctiva/sclera: Conjunctivae normal.   Cardiovascular:      Rate and Rhythm: Normal rate and regular rhythm.      Heart sounds: Normal heart sounds.   Pulmonary:      Effort: Pulmonary effort is normal.      Breath sounds: Normal breath sounds.   Abdominal:      General: Bowel sounds are normal.      Palpations: Abdomen is soft.   Musculoskeletal:         General: Normal range of motion.      Cervical back: Normal range of motion and neck supple.   Skin:     General: Skin is warm and dry.      Capillary Refill: Capillary refill takes less than 2 seconds.   Neurological:      Mental Status: She is alert and oriented to person, place, and time.   Psychiatric:         Behavior: Behavior normal.         Assessment/Plan   Diagnoses and all orders for this visit:    TACOS (generalized anxiety disorder) (Primary)  Assessment & Plan:  Start celexa, recheck 6 " weeks    Orders:  -     citalopram (CeleXA) 40 mg tablet; Take 1 tablet (40 mg total) by mouth daily.    Insomnia due to other mental disorder  Assessment & Plan:  Try doxepin low dose. Not a great candidate for melatonin as it is more of a sleep maintenance issue. Discussed sleep hygiene and gave handout    Orders:  -     doxepin (SILENOR) 3 mg tablet; Take 1 tablet (3 mg total) by mouth nightly.    Screening for tuberculosis  -     TB Skin Test    Return in about 6 weeks (around 6/28/2022) for PE.    Belén Moreira DO

## 2022-05-19 ENCOUNTER — CLINICAL SUPPORT (OUTPATIENT)
Dept: FAMILY MEDICINE | Facility: CLINIC | Age: 37
End: 2022-05-19
Payer: COMMERCIAL

## 2022-05-19 DIAGNOSIS — Z11.1 SCREENING FOR TUBERCULOSIS: Primary | ICD-10-CM

## 2022-05-19 LAB
INDURATION: 0 MM
TB SKIN TEST: NEGATIVE

## 2022-05-23 ENCOUNTER — TELEPHONE (OUTPATIENT)
Dept: FAMILY MEDICINE | Facility: CLINIC | Age: 37
End: 2022-05-23
Payer: COMMERCIAL

## 2022-05-23 DIAGNOSIS — F99 INSOMNIA DUE TO OTHER MENTAL DISORDER: Primary | ICD-10-CM

## 2022-05-23 DIAGNOSIS — F51.05 INSOMNIA DUE TO OTHER MENTAL DISORDER: Primary | ICD-10-CM

## 2022-05-23 RX ORDER — DOXEPIN HYDROCHLORIDE 10 MG/1
10 CAPSULE ORAL NIGHTLY
Qty: 30 CAPSULE | Refills: 1 | Status: SHIPPED | OUTPATIENT
Start: 2022-05-23 | End: 2022-07-20

## 2022-05-23 NOTE — TELEPHONE ENCOUNTER
Insurance denied 3mg strength, but lists 10mg as acceptable alternative.     10mg strength sent to pharmacy.    Please call patient to inform

## 2022-07-20 DIAGNOSIS — F51.05 INSOMNIA DUE TO OTHER MENTAL DISORDER: ICD-10-CM

## 2022-07-20 DIAGNOSIS — F99 INSOMNIA DUE TO OTHER MENTAL DISORDER: ICD-10-CM

## 2022-07-20 RX ORDER — DOXEPIN HYDROCHLORIDE 10 MG/1
10 CAPSULE ORAL NIGHTLY
Qty: 30 CAPSULE | Refills: 1 | Status: SHIPPED | OUTPATIENT
Start: 2022-07-20 | End: 2022-09-19

## 2022-08-17 ENCOUNTER — TELEMEDICINE (OUTPATIENT)
Dept: FAMILY MEDICINE | Facility: CLINIC | Age: 37
End: 2022-08-17
Payer: COMMERCIAL

## 2022-08-17 DIAGNOSIS — F17.210 CIGARETTE SMOKER MOTIVATED TO QUIT: ICD-10-CM

## 2022-08-17 DIAGNOSIS — F51.05 INSOMNIA DUE TO OTHER MENTAL DISORDER: Primary | ICD-10-CM

## 2022-08-17 DIAGNOSIS — F41.1 GAD (GENERALIZED ANXIETY DISORDER): ICD-10-CM

## 2022-08-17 DIAGNOSIS — F99 INSOMNIA DUE TO OTHER MENTAL DISORDER: Primary | ICD-10-CM

## 2022-08-17 PROCEDURE — 99214 OFFICE O/P EST MOD 30 MIN: CPT | Mod: 95 | Performed by: FAMILY MEDICINE

## 2022-08-17 NOTE — PROGRESS NOTES
"Verification of Patient Location:  The patient affirms they are currently located in the following state: [place]    Request for Consent:    Audio and Video Encounter   Hello, my name is Belén Moreira DO.  Before we proceed, can you please verify your identification by telling me your full name and date of birth?  Can you tell me who is in the room with you?    You and I are about to have a telemedicine check-in or visit because you have requested it.  This is a live video-conference.  I am a real person, speaking to you in real time.  There is no one else with me on the video-conference.  However, when we use ([employer], [employer], etc) it is important for you to know that the video-conference may not be secure or private.  I am not recording this conversation and I am asking you not to record it.  This telemedicine visit will be billed to your health insurance or you, if you are self-insured.  You understand you will be responsible for any copayments or coinsurances that apply to your telemedicine visit.  Communication platform used for this encounter:  [employer] Video Visit (no Zoom)     Before starting our telemedicine visit, I am required to get your consent for this virtual check-in or visit by telemedicine. Do you consent?  Patient Response to Request for Consent:  Yes      Visit Documentation:  Subjective     Patient ID: Maye Ferris is a 37 y.o. female.  1985      HPI   Maye Ferris presents to discuss medications. States she \"hates the celexa\", would like to go back on the Wellbutrin. Feels she is smoking more, drinking more, and gained weight. Not sure about any mood improvement due to her whole family getting COVID.     The following have been reviewed and updated as appropriate in this visit:          Review of Systems   All other systems reviewed and are negative.        Assessment/Plan   Diagnoses and all orders for this visit:    Insomnia due to other mental disorder " (Primary)  Assessment & Plan:  Continue doxepin      TACOS (generalized anxiety disorder)  Assessment & Plan:  Start wellbutrin 150mg for 1 week, then increase to 300mg daily. Stop celexa      Cigarette smoker motivated to quit  Assessment & Plan:  Restart wellbutrin        Time Spent:  I spent 22 minutes on this date of service performing the following activities: obtaining history, entering orders, documenting, preparing for visit, obtaining / reviewing records and providing counseling and education.    Return in about 6 weeks (around 9/28/2022) for PE.    Belén Moreira, DO  8/17/2022

## 2022-09-18 DIAGNOSIS — F51.05 INSOMNIA DUE TO OTHER MENTAL DISORDER: ICD-10-CM

## 2022-09-18 DIAGNOSIS — F99 INSOMNIA DUE TO OTHER MENTAL DISORDER: ICD-10-CM

## 2022-09-19 RX ORDER — DOXEPIN HYDROCHLORIDE 10 MG/1
10 CAPSULE ORAL NIGHTLY
Qty: 30 CAPSULE | Refills: 1 | Status: SHIPPED | OUTPATIENT
Start: 2022-09-19 | End: 2022-11-28
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
## 2023-04-24 ENCOUNTER — TELEPHONE (OUTPATIENT)
Dept: FAMILY MEDICINE | Facility: CLINIC | Age: 38
End: 2023-04-24
Payer: COMMERCIAL

## 2023-04-24 NOTE — TELEPHONE ENCOUNTER
Patient called and left VM that she hurt her knee   Will need appt for evaluation  Please schedule SD

## 2023-04-25 ENCOUNTER — OFFICE VISIT (OUTPATIENT)
Dept: FAMILY MEDICINE | Facility: CLINIC | Age: 38
End: 2023-04-25
Payer: COMMERCIAL

## 2023-04-25 VITALS
WEIGHT: 192.8 LBS | BODY MASS INDEX: 34.15 KG/M2 | HEART RATE: 98 BPM | DIASTOLIC BLOOD PRESSURE: 88 MMHG | SYSTOLIC BLOOD PRESSURE: 118 MMHG | OXYGEN SATURATION: 100 % | TEMPERATURE: 98.2 F

## 2023-04-25 DIAGNOSIS — M25.462 KNEE EFFUSION, LEFT: ICD-10-CM

## 2023-04-25 DIAGNOSIS — S83.8X2A MENISCAL INJURY, LEFT, INITIAL ENCOUNTER: Primary | ICD-10-CM

## 2023-04-25 DIAGNOSIS — M25.562 ACUTE PAIN OF LEFT KNEE: ICD-10-CM

## 2023-04-25 PROCEDURE — 3008F BODY MASS INDEX DOCD: CPT | Performed by: NURSE PRACTITIONER

## 2023-04-25 PROCEDURE — 99213 OFFICE O/P EST LOW 20 MIN: CPT | Performed by: NURSE PRACTITIONER

## 2023-04-25 RX ORDER — NAPROXEN 500 MG/1
500 TABLET ORAL 2 TIMES DAILY WITH MEALS
Qty: 90 TABLET | Refills: 0 | Status: SHIPPED | OUTPATIENT
Start: 2023-04-25 | End: 2024-08-01

## 2023-04-25 RX ORDER — FLUTICASONE PROPIONATE 50 MCG
2 SPRAY, SUSPENSION (ML) NASAL DAILY PRN
Qty: 16 G | Refills: 2 | Status: SHIPPED | OUTPATIENT
Start: 2023-04-25 | End: 2024-08-01

## 2023-04-25 ASSESSMENT — ENCOUNTER SYMPTOMS
LOSS OF MOTION: 0
TINGLING: 0
INABILITY TO BEAR WEIGHT: 0
MUSCLE WEAKNESS: 0
LOSS OF SENSATION: 0
NUMBNESS: 0

## 2023-04-25 NOTE — PATIENT INSTRUCTIONS
Please use crutches for now     Elevation, compression and heat     CBD+ pomade [employer]     Stop Advil,Aleve, ibuprofen- all NSAIDS other than naprosyn use for 3-5 days only and take with food if heartburn take pepcid    Schedule with ortho if worsening or large effusion(ie fluid around knee)    [facility]     Ambulate as tolerated and be sure to flex and point your toe during the day and bending knee

## 2023-04-25 NOTE — LETTER
April 25, 2023     Patient: Maye Ferris  YOB: 1985  Date of Visit: 4/25/2023    To Whom it May Concern:    Maye Ferris was seen in my clinic on 4/25/2023 at 9:20 am. Please excuse Maye for her absence from work on this day to make the appointment as well this week. She may return on Monday or sooner as long as symptoms improving.      If you have any questions or concerns, please don't hesitate to call.         Sincerely,           MORRIS Worley        CC: No Recipients

## 2023-04-25 NOTE — PROGRESS NOTES
Reason for visit:   Chief Complaint   Patient presents with   • Knee Pain     L knee pain since injury on Saturday.       HPI  is a 37 y.o. female     Knee Pain   Incident onset: Day 3  Incident location: lifting heavy object and felt pop in knee also audible.  Denies twisting or fall  The pain is present in the left knee. The pain is at a severity of 7/10. The pain has been constant since onset. Pertinent negatives include no inability to bear weight, loss of motion, loss of sensation, muscle weakness, numbness or tingling. Associated symptoms comments: Ana Cristina with movement, has not trialed stairs but she is able to get up on exam table   Swelling worse at end of the day   Denies history of prior trauma or surgery . She has tried ice and NSAIDs (soft brace, Motrin 800 mg three times daily otehr day ) for the symptoms. The treatment provided no relief.         Problem List:  Patient Active Problem List    Diagnosis Date Noted   • Insomnia due to other mental disorder 05/17/2022   • TACOS (generalized anxiety disorder) 11/12/2021   • Cigarette smoker motivated to quit 11/12/2021       Surgical History:  History reviewed. No pertinent surgical history.    Social History:  Social History     Social History Narrative   • Not on file       Family History:  Family History   Family history unknown: Yes       Allergies:  Amoxicillin    Current Medications:  Current Outpatient Medications   Medication Sig Dispense Refill   • bupropion HCl (WELLBUTRIN ORAL) Take 300 mg by mouth once daily.     • LARA 0.35 mg tablet Take 0.035 mg by mouth.     • doxepin (SINEquan) 10 mg capsule TAKE 1 CAPSULE (10 MG TOTAL) BY MOUTH NIGHTLY. 90 capsule 1   • fluticasone propionate (FLONASE) 50 mcg/actuation nasal spray Administer into each nostril as needed.     • citalopram (CeleXA) 40 mg tablet Take 1 tablet (40 mg total) by mouth daily. (Patient not taking: Reported on 4/25/2023) 30 tablet 1   • ibuprofen (MOTRIN) 600 mg tablet TAKE 1  TABLET (600MG TOTAL) BY MOUTH EVERY 8 HOURS AS NEEDED FOR MILD PAIN  0     No current facility-administered medications for this visit.         Review of Systems:  See HPI   Review of Systems   Neurological: Negative for tingling and numbness.   All other systems reviewed and are negative.      Objective     Vital Signs:  Vitals:    04/25/23 0924   BP: 118/88   Pulse: 98   Temp: 36.8 °C (98.2 °F)   SpO2: 100%       BMI:  Body mass index is 34.15 kg/m².     Physical Exam  Constitutional:       Appearance: Normal appearance. She is not diaphoretic.   Musculoskeletal:      Comments: Knee normal ROM but pain with movement   + prepatellar and patellar effusions small   No warmth  No LE swelling    Negative l[employer]man  Negative drawer  + laxity with internal meniscal maneuver only   No buckling with step up or down or walk  + limping gait   Normal sensation      No bakers cyst     Normal temperature      Skin:     Capillary Refill: Capillary refill takes less than 2 seconds.      Findings: Bruising (around left knee generalized and minimal no ecchymotic areas below ) present.   Neurological:      Mental Status: She is alert.   Psychiatric:         Mood and Affect: Mood normal.         Recent labs before today:     Lab Results   Component Value Date    WBC 8.0 07/16/2021    HGB 13.8 07/16/2021    HCT 42.2 07/16/2021     07/16/2021    CHOL 168 07/16/2021    TRIG 106 07/16/2021    HDL 56 07/16/2021    ALT 16 07/16/2021    AST 17 07/16/2021     07/16/2021    K 4.2 07/16/2021     07/16/2021    CREATININE 0.73 07/16/2021    BUN 9 07/16/2021    CO2 25 07/16/2021    TSH 2.67 07/16/2021       Patient Instructions   Please use crutches for now     Elevation, compression and heat     CBD+ pomade [employer]     Stop Advil,Aleve, ibuprofen- all NSAIDS other than naprosyn use for 3-5 days only and take with food if heartburn take pepcid    Schedule with ortho if worsening or large effusion(ie fluid around knee)    [facility]     Ambulate as tolerated and be sure to flex and point your toe during the day and bending knee     If you do not hear from me regarding results in 7-10 days either via a call or the portal please call.      Problem List Items Addressed This Visit    None  Visit Diagnoses     Meniscal injury, left, initial encounter    -  Primary    Relevant Medications    naproxen (NAPROSYN) 500 mg tablet    Other Relevant Orders    Ambulatory referral to Physical Therapy    Crutches    Acute pain of left knee        Relevant Medications    naproxen (NAPROSYN) 500 mg tablet    Other Relevant Orders    Ambulatory referral to Physical Therapy    Crutches    Knee effusion, left        Relevant Orders    Ambulatory referral to Physical Therapy    Crutches      I spent 32 minutes on this date of service performing the following activities: obtaining history, performing examination, entering orders and providing counseling and education.    Instructed on indications for imaging and ortho evaluation            MORRIS Stewart  4/25/2023

## 2023-06-20 ENCOUNTER — APPOINTMENT (OUTPATIENT)
Dept: RADIOLOGY | Facility: CLINIC | Age: 38
End: 2023-06-20
Payer: COMMERCIAL

## 2023-06-20 ENCOUNTER — OFFICE VISIT (OUTPATIENT)
Dept: OBGYN CLINIC | Facility: CLINIC | Age: 38
End: 2023-06-20
Payer: COMMERCIAL

## 2023-06-20 VITALS
DIASTOLIC BLOOD PRESSURE: 91 MMHG | HEART RATE: 106 BPM | SYSTOLIC BLOOD PRESSURE: 126 MMHG | WEIGHT: 189 LBS | BODY MASS INDEX: 33.49 KG/M2 | HEIGHT: 63 IN

## 2023-06-20 DIAGNOSIS — M25.462 EFFUSION OF LEFT KNEE: ICD-10-CM

## 2023-06-20 DIAGNOSIS — M25.562 MECHANICAL PAIN OF LEFT KNEE: ICD-10-CM

## 2023-06-20 DIAGNOSIS — M25.562 ACUTE PAIN OF LEFT KNEE: Primary | ICD-10-CM

## 2023-06-20 DIAGNOSIS — M23.92 ACUTE INTERNAL DERANGEMENT OF KNEE, LEFT: ICD-10-CM

## 2023-06-20 DIAGNOSIS — M25.562 ACUTE PAIN OF LEFT KNEE: ICD-10-CM

## 2023-06-20 PROCEDURE — 73562 X-RAY EXAM OF KNEE 3: CPT

## 2023-06-20 PROCEDURE — 99203 OFFICE O/P NEW LOW 30 MIN: CPT | Performed by: PHYSICIAN ASSISTANT

## 2023-06-20 NOTE — PROGRESS NOTES
Orthopaedic Surgery - Office Note  Swetha Shannon (11 y o  female)   : 1985   MRN: [id number]  Encounter Date: 2023    Chief Complaint   Patient presents with   • Left Knee - Pain         Assessment/Plan  Diagnoses and all orders for this visit:    Acute pain of left knee  -     XR knee 3 vw left non injury; Future  -     MRI knee left  wo contrast; Future  -     Ambulatory Referral to Physical Therapy; Future  -     Ambulatory Referral to Orthopedic Surgery; Future    Mechanical pain of left knee  -     MRI knee left  wo contrast; Future  -     Ambulatory Referral to Physical Therapy; Future  -     Ambulatory Referral to Orthopedic Surgery; Future    Effusion of left knee  -     MRI knee left  wo contrast; Future  -     Ambulatory Referral to Physical Therapy; Future  -     Ambulatory Referral to Orthopedic Surgery; Future    Acute internal derangement of knee, left  -     MRI knee left  wo contrast; Future  -     Ambulatory Referral to Physical Therapy; Future  -     Ambulatory Referral to Orthopedic Surgery; Future    I reviewed with the patient I am concerned she has an acute medial meniscus tear after her injury  Due to the continued mechanical symptoms and joint effusion I would recommend an MRI to evaluate for an acute medial meniscus tear  In the meantime she will be started in physical therapy as she is developing early patellofemoral symptoms likely secondary to deconditioning  She will continue to ice the knee for comfort 20 minutes on 1 hour off 3 times a day  She may use an oral anti-inflammatory such as Aleve 1 tablet twice daily with food stopping and calling if any stomach upset occurs  She will return to discuss the MRI with an orthopedic surgeon  Return for Recheck with orthopedic surgeon to discuss MRI of knee  History of Present Illness  This is a new patient with left knee pain since injury on 2023  Patient was lifting an object and felt a pop in the knee  "Patient was seen after the injury at [facility] on 4/25/2023 and advised to follow-up with orthopedics if symptoms do not improve  Patient was referred to physical therapy at that time given crutches and started on prescription strength naproxen-but has not attended PT  She has been dealing with the pain since that time with a popping and catching sensation in the medial knee since the injury  The pain is sharp when it occurs and almost brings her to her knees  She has had swelling sensation in the knee since the injury in April  No calf pain or paresthesias are reported  She reports she thought it was going to get better on its own but has not  She has been icing and stretching the knee regularly  Review of Systems  Pertinent items are noted in HPI  All other systems were reviewed and are negative  Physical Exam  /91   Pulse (!) 106   Ht 5' 3\" (1 6 m)   Wt 85 7 kg (189 lb)   BMI 33 48 kg/m²   Cons: Appears well  No apparent distress  Psych: Alert  Oriented x3  Mood and affect normal     Patient's left knee is with a 1+ intra-articular effusion today in the office  She is tender on the medial joint line with a positive medial Ryan's  She is mildly tender on the medial lateral border of the patella  She is nontender on the lateral joint line  She has mild patellar apprehension  She has full extension and flexes to 110 degrees  She has no pain or instability to Lachman and posterior drawer  She has no pain or instability with valgus and varus stressing  She has no calf tenderness and a negative Homans  Her gait is heel-to-toe  She is quad strength decreased to 4 out of 5  Hamstring strength is 5 out of 5  X-rays performed in the office today 3 views of the left knee show no acute fractures or dislocations  Joint spaces are well-maintained  No significant degenerative changes are seen    X-rays were reviewed from an orthopedic standpoint will await official " radiologist interpretation      Studies Reviewed  [facility] [place] notes were reviewed by myself in the office today  Procedures  No procedures today  Medical, Surgical, Family, and Social History  The patient's medical history, family history, and social history, were reviewed and updated as appropriate  History reviewed  No pertinent past medical history  Past Surgical History:   Procedure Laterality Date   • FOOT SURGERY     • MOUTH SURGERY         History reviewed  No pertinent family history      Social History     Occupational History   • Not on file   Tobacco Use   • Smoking status: Every Day   • Smokeless tobacco: Never   Substance and Sexual Activity   • Alcohol use: Yes     Comment: socially   • Drug use: No   • Sexual activity: Not on file       Allergies   Allergen Reactions   • Amoxicillin    • Tetanus Toxoids          Current Outpatient Medications:   •  cyclobenzaprine (FLEXERIL) 10 mg tablet, Take 1 tablet (10 mg total) by mouth 2 (two) times a day as needed for muscle spasms, Disp: 20 tablet, Rfl: 0  •  ibuprofen (MOTRIN) 600 mg tablet, Take 1 tablet (600 mg total) by mouth every 8 (eight) hours as needed for mild pain, Disp: 30 tablet, Rfl: 0  •  oxyCODONE (ROXICODONE) 30 MG immediate release tablet, Take 0 5 tablets (15 mg total) by mouth every 6 (six) hours as needed for moderate pain Max Daily Amount: 60 mg, Disp: 20 tablet, Rfl: 0  •  ibuprofen (MOTRIN) 600 mg tablet, Take 1 tablet (600 mg total) by mouth every 8 (eight) hours as needed for moderate pain (Patient not taking: Reported on 6/20/2023), Disp: 30 tablet, Rfl: 0      Ren Monroy PA-C

## 2023-06-20 NOTE — PATIENT INSTRUCTIONS
Meniscus Tear   WHAT YOU NEED TO KNOW:   What is a meniscus tear? A meniscus tear is a tear in the cartilage of your knee  The meniscus is a piece of cartilage (strong tissue) between your thighbone and shinbone  The meniscus helps to cushion your knee joint and keep it stable  What causes a meniscus tear? A meniscus tear can occur if you twist your knee  A meniscus tear can happen during sports that involve squatting and twisting the knee, such as football or basketball  Weak and thinned meniscus cartilage in older people can increase the risk for a meniscus tear  What are the signs and symptoms of a meniscus tear? A pop or tear when the injury happens    Pain and swelling    Tenderness    Stiffness    Popping, catching, or locking of your knee    Not being able to extend your knee fully    How is a meniscus tear diagnosed? Your healthcare provider will examine your knee  Your provider may bend your knee and then straighten and rotate it  Your provider may also order x-rays and an MRI of your knee  An MRI takes pictures of your knee to show the meniscus tear  You may be given contrast liquid to help the tear show up better  Tell the healthcare provider if you have ever had an allergic reaction to contrast liquid  Do not enter the MRI room with anything metal  Metal can cause serious injury  Tell the healthcare provider if you have any metal in or on your body  How is a meniscus tear treated? Treatment depends on the type of tear you have  Some types of meniscus tears can heal on their own  You may need any of the following:  NSAIDs , such as ibuprofen, help decrease swelling, pain, and fever  This medicine is available with or without a doctor's order  NSAIDs can cause stomach bleeding or kidney problems in certain people  If you take blood thinner medicine, always ask if NSAIDs are safe for you  Always read the medicine label and follow directions   Do not give these medicines to children younger than 6 months without direction from a healthcare provider  Rest  your knee  Avoid activities that make the swelling or pain worse  You may need to avoid putting weight on your leg while you have pain  Your healthcare provider may recommend that you use crutches  Apply ice  on your knee for 15 to 20 minutes every hour or as directed  Use an ice pack, or put crushed ice in a plastic bag  Cover it with a towel  Ice helps prevent tissue damage and decreases swelling and pain  Compress  your knee with an elastic bandage, air cast, medical boot, or splint to reduce swelling  Ask your healthcare provider which compression device to use, and how tight it should be  Elevate  your knee above the level of your heart as often as you can  This will help decrease swelling and pain  Prop your knee on pillows or blankets to keep it elevated comfortably  Surgery  may be needed if your symptoms do not improve  Your healthcare provider may trim away or repair damaged tissue  Call your local emergency number (911 in the [address] [facility]) if:   Your arm or leg feels warm, tender, and painful  It may look swollen and red  When should I seek immediate care? You cannot move your knee at all  When should I call my doctor? Your symptoms do not improve with treatment  You have questions or concerns about your condition or care  CARE AGREEMENT:   You have the right to help plan your care  Learn about your health condition and how it may be treated  Discuss treatment options with your healthcare providers to decide what care you want to receive  You always have the right to refuse treatment  The above information is an  only  It is not intended as medical advice for individual conditions or treatments  Talk to your doctor, nurse or pharmacist before following any medical regimen to see if it is safe and effective for you    © Copyright Justin Acuna 2022 Information is for End User's use only and may not be sold, redistributed or otherwise used for commercial purposes

## 2023-10-17 DIAGNOSIS — F51.05 INSOMNIA DUE TO OTHER MENTAL DISORDER: ICD-10-CM

## 2023-10-17 DIAGNOSIS — F99 INSOMNIA DUE TO OTHER MENTAL DISORDER: ICD-10-CM

## 2023-10-17 NOTE — TELEPHONE ENCOUNTER
Patient calling for a refill of her doxepin 10mg.    Pharmacy: [facility]/pharmacy #[id number] [place] - [address]

## 2023-10-18 RX ORDER — DOXEPIN HYDROCHLORIDE 10 MG/1
10 CAPSULE ORAL NIGHTLY
Qty: 30 CAPSULE | Refills: 0 | OUTPATIENT
Start: 2023-10-18 | End: 2024-10-17

## 2023-10-18 NOTE — TELEPHONE ENCOUNTER
"Hattie, please schedule pt.  Hasn't been seen by Dr. Moreira since 8/2022 and only saw Gris Perea for a meniscus injury in April.  Never seen for routine exam this year.  Needs appt for 90 day supply and further refills.      Medicine Refill Request    Last Office Visit: 4/25/2023   Last Consult Visit: Visit date not found  Last Telemedicine Visit: 8/17/2022 Belén Moreira, DO    Next Appointment: Visit date not found      Current Outpatient Medications:     bupropion HCl (WELLBUTRIN ORAL), Take 300 mg by mouth once daily., Disp: , Rfl:     LARA 0.35 mg tablet, Take 0.035 mg by mouth., Disp: , Rfl:     doxepin (SINEquan) 10 mg capsule, TAKE 1 CAPSULE (10 MG TOTAL) BY MOUTH NIGHTLY., Disp: 90 capsule, Rfl: 1    fluticasone propionate (FLONASE) 50 mcg/actuation nasal spray, Administer 2 sprays into each nostril daily as needed for allergies., Disp: 16 g, Rfl: 2    ibuprofen (MOTRIN) 600 mg tablet, TAKE 1 TABLET (600MG TOTAL) BY MOUTH EVERY 8 HOURS AS NEEDED FOR MILD PAIN, Disp: , Rfl: 0    naproxen (NAPROSYN) 500 mg tablet, Take 1 tablet (500 mg total) by mouth 2 (two) times a day with meals., Disp: 90 tablet, Rfl: 0      BP Readings from Last 3 Encounters:   04/25/23 118/88   05/17/22 120/72   11/11/21 110/85       Recent Lab results:  Lab Results   Component Value Date    CHOL 168 07/16/2021   ,   Lab Results   Component Value Date    HDL 56 07/16/2021   ,   Lab Results   Component Value Date    LDLCALC 92 07/16/2021   ,   Lab Results   Component Value Date    TRIG 106 07/16/2021        Lab Results   Component Value Date    GLUCOSE 83 07/16/2021   , No results found for: \"HGBA1C\"      Lab Results   Component Value Date    CREATININE 0.73 07/16/2021       Lab Results   Component Value Date    TSH 2.67 07/16/2021         No results found for: \"HGBA1C\"  "

## 2024-08-01 ENCOUNTER — OFFICE VISIT (OUTPATIENT)
Dept: FAMILY MEDICINE | Facility: CLINIC | Age: 39
End: 2024-08-01
Payer: COMMERCIAL

## 2024-08-01 VITALS
SYSTOLIC BLOOD PRESSURE: 136 MMHG | OXYGEN SATURATION: 98 % | HEART RATE: 88 BPM | WEIGHT: 197.8 LBS | TEMPERATURE: 98.2 F | DIASTOLIC BLOOD PRESSURE: 82 MMHG | BODY MASS INDEX: 35.05 KG/M2 | HEIGHT: 63 IN

## 2024-08-01 DIAGNOSIS — F99 INSOMNIA DUE TO OTHER MENTAL DISORDER: ICD-10-CM

## 2024-08-01 DIAGNOSIS — Z00.00 WELL ADULT EXAM: Primary | ICD-10-CM

## 2024-08-01 DIAGNOSIS — F51.05 INSOMNIA DUE TO OTHER MENTAL DISORDER: ICD-10-CM

## 2024-08-01 PROCEDURE — 3008F BODY MASS INDEX DOCD: CPT | Performed by: FAMILY MEDICINE

## 2024-08-01 PROCEDURE — 99396 PREV VISIT EST AGE 40-64: CPT | Performed by: FAMILY MEDICINE

## 2024-08-01 RX ORDER — DOXEPIN HYDROCHLORIDE 10 MG/1
10 CAPSULE ORAL NIGHTLY
Qty: 90 CAPSULE | Refills: 1 | Status: SHIPPED | OUTPATIENT
Start: 2024-08-01 | End: 2025-02-03 | Stop reason: SDUPTHER

## 2024-08-01 RX ORDER — BUPROPION HYDROCHLORIDE 150 MG/1
TABLET ORAL
Qty: 53 TABLET | Refills: 0 | Status: SHIPPED | OUTPATIENT
Start: 2024-08-01 | End: 2024-08-31

## 2024-08-01 ASSESSMENT — PATIENT HEALTH QUESTIONNAIRE - PHQ9: SUM OF ALL RESPONSES TO PHQ9 QUESTIONS 1 & 2: 0

## 2024-08-01 NOTE — LETTER
August 1, 2024     Patient: Maye Ferris  YOB: 1985  Date of Visit: 8/1/2024    To Whom it May Concern:    Maye Ferris was seen in my clinic on 8/1/2024 at 1:00 pm. Please excuse Maye for her absence from work on this day to make the appointment.    If you have any questions or concerns, please don't hesitate to call.         Sincerely,         Belén Moreira,         CC: No Recipients

## 2024-08-01 NOTE — PROGRESS NOTES
"Subjective      Patient ID: Maye Ferris is a 39 y.o. female.    Maye Ferris presents as a established patient for routine physical exam    Occupation:     Lives with:  and 3 kids- 10 and 11 at home and 21    Social History     Tobacco Use    Smoking status: Every Day     Packs/day: .4     Types: Cigarettes    Smokeless tobacco: Never   Vaping Use    Vaping Use: Never used   Substance Use Topics    Alcohol use: Not Currently    Drug use: No        Diet: lactose intolerant  Exercise: none  Sleep:  hasn't had doxepin for a while, so not well recently  Mood:  has been off her medication for a while but feels she does want refill    Dental care: up to date  Optometry/ophthalmology: up to date    Patient Care Team     Relationship Specialty Notifications Start End   Belén Moreira DO PCP - General Family Medicine Admissions 12/28/20     Address:  [address] [place] [address]       Menstrual cycle: normal flow and regular  Pap: up to date and follows with [facility] provider  Mammogram:  none yet, no family history  Dexa: N/A  Colonoscopy: N/A    Vaccines: had tetanus in the last 10 years but not sure exactly when, at [place]    Additional questions/concerns:   States she has been in a vicious cycle of depression and weight- states she has given up fast food and alcohol. Feels this makes her more depression       The following have been reviewed and updated as appropriate in this visit:   Allergies  Meds  Problems         Review of Systems   All other systems reviewed and are negative.    Objective     Vitals:    08/01/24 1300   BP: 136/82   BP Location: Left upper arm   Patient Position: Sitting   Pulse: 88   Temp: 36.8 °C (98.2 °F)   TempSrc: Oral   SpO2: 98%   Weight: 89.7 kg (197 lb 12.8 oz)   Height: 1.6 m (5' 2.99\")     Body mass index is 35.05 kg/m².    Wt Readings from Last 5 Encounters:   08/01/24 89.7 kg (197 lb 12.8 oz)   04/25/23 87.5 kg (192 lb 12.8 " oz)   05/17/22 87.1 kg (192 lb)   11/11/21 86.3 kg (190 lb 3.2 oz)   09/30/21 83 kg (183 lb)       Lab Results   Component Value Date    WBC 8.0 07/16/2021    HGB 13.8 07/16/2021     07/16/2021     07/16/2021    K 4.2 07/16/2021    BUN 9 07/16/2021    CREATININE 0.73 07/16/2021    CHOL 168 07/16/2021    TRIG 106 07/16/2021    HDL 56 07/16/2021    LDLCALC 92 07/16/2021    ALT 16 07/16/2021    AST 17 07/16/2021    TSH 2.67 07/16/2021       The ASCVD Risk score (Oskar BELL, et al., 2019) failed to calculate for the following reasons:    Cannot find a previous HDL lab    Cannot find a previous total cholesterol lab    Physical Exam  Constitutional:       Appearance: She is well-developed.   HENT:      Head: Normocephalic and atraumatic.   Eyes:      Conjunctiva/sclera: Conjunctivae normal.   Cardiovascular:      Rate and Rhythm: Normal rate and regular rhythm.      Heart sounds: Normal heart sounds.   Pulmonary:      Effort: Pulmonary effort is normal.      Breath sounds: Normal breath sounds.   Abdominal:      General: Bowel sounds are normal.      Palpations: Abdomen is soft.   Musculoskeletal:         General: Normal range of motion.      Cervical back: Normal range of motion and neck supple.   Skin:     General: Skin is warm and dry.      Capillary Refill: Capillary refill takes less than 2 seconds.   Neurological:      Mental Status: She is alert and oriented to person, place, and time.   Psychiatric:         Behavior: Behavior normal.         Assessment/Plan   Diagnoses and all orders for this visit:    Well adult exam (Primary)  -     CBC and Differential; Future  -     Comprehensive metabolic panel; Future  -     Lipid panel; Future  -     TSH w reflex FT4; Future    Insomnia due to other mental disorder  -     doxepin (SINEquan) 10 mg capsule; Take 1 capsule (10 mg total) by mouth nightly.    BMI 35.0-35.9,adult  -     Ambulatory referral to Nutrition Services    Other orders  -     buPROPion XL  (WELLBUTRIN XL) 150 mg 24 hr tablet; Take 1 tablet (150 mg total) by mouth daily for 7 days, THEN 2 tablets (300 mg total) daily for 23 days.        Return in about 6 weeks (around 9/12/2024) for weight.  Belén Moreira DO

## 2024-08-02 ENCOUNTER — TELEPHONE (OUTPATIENT)
Dept: FAMILY MEDICINE | Facility: CLINIC | Age: 39
End: 2024-08-02

## 2024-08-02 NOTE — LETTER
[facility]  Preventive Exam Fax Back Request   Date:24     From: Belén Moreira DO/Clinical Team    RE:  Maye Ferris (: 1985)       We are reaching out to you because our mutual patient, Maye Ferris (: 1985), reported they had the preventive procedure(s) indicated below performed at your facility:     TDAP/TD VACCINATION      Please fax the most recent results to our office with this Cover Sheet.  (If no results are found, please check the appropriate box below)  ? - No results found  ? - Results attached    Please fax to: FAX# [phone]    THANK YOU FOR YOUR PARTNERSHIP IN   PROVIDING EXCELLENT CARE TO OUR PATIENTS!      This request is confidential and intended only for the use of the individual or entity to which it is addressed.  It may contain information that is privileged, confidential, and exempt from disclosure.  if the reader of this message is not the intended recipient, you are hereby notified that any dissemination, distribution, or copying of this communication is strictly prohibited.  If you believe you have received this communication in error, please notify us immediately at [phone]

## 2024-08-02 NOTE — TELEPHONE ENCOUNTER
Submitted electronic fax requesting pt's most recent tetanus from [facility] to update their chart at Dr. Moreira's request.

## 2024-08-02 NOTE — TELEPHONE ENCOUNTER
----- Message from Belén Moreira DO sent at 8/1/2024  1:28 PM EDT -----  Please request last tetanus from [facility]

## 2024-08-02 NOTE — LETTER
3RD ATTEMPT!          [facility]  Preventive Exam Fax Back Request   Date:24     From: Belén Moreira, /Clinical Team    RE:  Maye Ferris (: 1985)       We are reaching out to you because our mutual patient, Maye Ferris (: 1985), reported they had the preventive procedure(s) indicated below performed at your facility:     TD/TETANUS/TDAP VACCINATION RECORD      Please fax the most recent results to our office with this Cover Sheet.  (If no results are found, please check the appropriate box below)  ? - No results found  ? - Results attached    Please fax to: FAX# [phone]    THANK YOU FOR YOUR PARTNERSHIP IN   PROVIDING EXCELLENT CARE TO OUR PATIENTS!      This request is confidential and intended only for the use of the individual or entity to which it is addressed.  It may contain information that is privileged, confidential, and exempt from disclosure.  if the reader of this message is not the intended recipient, you are hereby notified that any dissemination, distribution, or copying of this communication is strictly prohibited.  If you believe you have received this communication in error, please notify us immediately at [phone]

## 2024-08-02 NOTE — LETTER
2ND ATTEMPT            [facility]  Preventive Exam Fax Back Request   Date:24     From: Belén Moreira, /Clinical Team    RE:  Maye Ferris (: 1985)       We are reaching out to you because our mutual patient, Maye Ferris (: 1985), reported they had the preventive procedure(s) indicated below performed at your facility:     TETANUS VACCINATION RECORDS      Please fax the most recent results to our office with this Cover Sheet.  (If no results are found, please check the appropriate box below)  ? - No results found  ? - Results attached    Please fax to: FAX# [phone]    THANK YOU FOR YOUR PARTNERSHIP IN   PROVIDING EXCELLENT CARE TO OUR PATIENTS!      This request is confidential and intended only for the use of the individual or entity to which it is addressed.  It may contain information that is privileged, confidential, and exempt from disclosure.  if the reader of this message is not the intended recipient, you are hereby notified that any dissemination, distribution, or copying of this communication is strictly prohibited.  If you believe you have received this communication in error, please notify us immediately at [phone]

## 2024-10-03 ENCOUNTER — TELEMEDICINE (OUTPATIENT)
Dept: FAMILY MEDICINE | Facility: CLINIC | Age: 39
End: 2024-10-03
Payer: COMMERCIAL

## 2024-10-03 DIAGNOSIS — R63.8 UNABLE TO LOSE WEIGHT: Primary | ICD-10-CM

## 2024-10-03 PROCEDURE — 99999 PR OFFICE/OUTPT VISIT,PROCEDURE ONLY: CPT | Mod: 95 | Performed by: FAMILY MEDICINE

## 2024-10-03 NOTE — PROGRESS NOTES
Pt aware that Dr. Moreira will be on the virtual visit shortly.  Pt appreciated the call.    GE Weiss

## 2025-02-02 DIAGNOSIS — F99 INSOMNIA DUE TO OTHER MENTAL DISORDER: ICD-10-CM

## 2025-02-02 DIAGNOSIS — F51.05 INSOMNIA DUE TO OTHER MENTAL DISORDER: ICD-10-CM

## 2025-02-03 DIAGNOSIS — F51.05 INSOMNIA DUE TO OTHER MENTAL DISORDER: ICD-10-CM

## 2025-02-03 DIAGNOSIS — F99 INSOMNIA DUE TO OTHER MENTAL DISORDER: ICD-10-CM

## 2025-02-03 RX ORDER — DOXEPIN HYDROCHLORIDE 10 MG/1
10 CAPSULE ORAL NIGHTLY
Qty: 90 CAPSULE | Refills: 0 | Status: SHIPPED | OUTPATIENT
Start: 2025-02-03 | End: 2026-02-03

## 2025-02-03 RX ORDER — DOXEPIN HYDROCHLORIDE 10 MG/1
CAPSULE ORAL
Qty: 90 CAPSULE | Refills: 0 | OUTPATIENT
Start: 2025-02-03

## 2025-02-03 RX ORDER — DOXEPIN HYDROCHLORIDE 10 MG/1
10 CAPSULE ORAL NIGHTLY
Qty: 30 CAPSULE | Refills: 0 | Status: CANCELLED | OUTPATIENT
Start: 2025-02-03 | End: 2026-02-03

## 2025-02-03 NOTE — TELEPHONE ENCOUNTER
"Medicine Refill Request    Last Office Visit: 8/1/2024   Last Consult Visit: Visit date not found  Last Telemedicine Visit: 10/3/2024 Belén Moreira, DO    Next Appointment: 4/14/2025      Current Outpatient Medications:     buPROPion XL (WELLBUTRIN XL) 150 mg 24 hr tablet, Take 1 tablet (150 mg total) by mouth daily for 7 days, THEN 2 tablets (300 mg total) daily for 23 days., Disp: 53 tablet, Rfl: 0    doxepin (SINEquan) 10 mg capsule, Take 1 capsule (10 mg total) by mouth nightly., Disp: 90 capsule, Rfl: 1    ibuprofen (MOTRIN) 600 mg tablet, TAKE 1 TABLET (600MG TOTAL) BY MOUTH EVERY 8 HOURS AS NEEDED FOR MILD PAIN, Disp: , Rfl: 0      BP Readings from Last 3 Encounters:   08/01/24 136/82   04/25/23 118/88   05/17/22 120/72       Recent Lab results:  Lab Results   Component Value Date    CHOL 168 07/16/2021   ,   Lab Results   Component Value Date    HDL 56 07/16/2021   ,   Lab Results   Component Value Date    LDLCALC 92 07/16/2021   ,   Lab Results   Component Value Date    TRIG 106 07/16/2021        Lab Results   Component Value Date    GLUCOSE 83 07/16/2021   , No results found for: \"HGBA1C\"      Lab Results   Component Value Date    CREATININE 0.73 07/16/2021       Lab Results   Component Value Date    TSH 2.67 07/16/2021         No results found for: \"HGBA1C\"   "

## 2025-02-03 NOTE — TELEPHONE ENCOUNTER
"Medicine Refill Request    Last Office Visit: 8/1/2024   Last Consult Visit: Visit date not found  Last Telemedicine Visit: 10/3/2024 Belén Moreira, DO    Next Appointment: Visit date not found      Current Outpatient Medications:     buPROPion XL (WELLBUTRIN XL) 150 mg 24 hr tablet, Take 1 tablet (150 mg total) by mouth daily for 7 days, THEN 2 tablets (300 mg total) daily for 23 days., Disp: 53 tablet, Rfl: 0    doxepin (SINEquan) 10 mg capsule, Take 1 capsule (10 mg total) by mouth nightly., Disp: 90 capsule, Rfl: 1    ibuprofen (MOTRIN) 600 mg tablet, TAKE 1 TABLET (600MG TOTAL) BY MOUTH EVERY 8 HOURS AS NEEDED FOR MILD PAIN, Disp: , Rfl: 0      BP Readings from Last 3 Encounters:   08/01/24 136/82   04/25/23 118/88   05/17/22 120/72       Recent Lab results:  Lab Results   Component Value Date    CHOL 168 07/16/2021   ,   Lab Results   Component Value Date    HDL 56 07/16/2021   ,   Lab Results   Component Value Date    LDLCALC 92 07/16/2021   ,   Lab Results   Component Value Date    TRIG 106 07/16/2021        Lab Results   Component Value Date    GLUCOSE 83 07/16/2021   , No results found for: \"HGBA1C\"      Lab Results   Component Value Date    CREATININE 0.73 07/16/2021       Lab Results   Component Value Date    TSH 2.67 07/16/2021         No results found for: \"HGBA1C\"   "

## 2025-02-03 NOTE — TELEPHONE ENCOUNTER
"Hattie, please reschedule pt.  Never rescheduled after leaving before last appt on 10/3/24 started.  Has been overdue for med check since September.    Thank you!      Medicine Refill Request    Last Office Visit: 8/1/2024   Last Consult Visit: Visit date not found  Last Telemedicine Visit: 10/3/2024 Belén Moreira, DO    Next Appointment: Visit date not found      Current Outpatient Medications:     buPROPion XL (WELLBUTRIN XL) 150 mg 24 hr tablet, Take 1 tablet (150 mg total) by mouth daily for 7 days, THEN 2 tablets (300 mg total) daily for 23 days., Disp: 53 tablet, Rfl: 0    doxepin (SINEquan) 10 mg capsule, Take 1 capsule (10 mg total) by mouth nightly., Disp: 90 capsule, Rfl: 1    ibuprofen (MOTRIN) 600 mg tablet, TAKE 1 TABLET (600MG TOTAL) BY MOUTH EVERY 8 HOURS AS NEEDED FOR MILD PAIN, Disp: , Rfl: 0      BP Readings from Last 3 Encounters:   08/01/24 136/82   04/25/23 118/88   05/17/22 120/72       Recent Lab results:  Lab Results   Component Value Date    CHOL 168 07/16/2021   ,   Lab Results   Component Value Date    HDL 56 07/16/2021   ,   Lab Results   Component Value Date    LDLCALC 92 07/16/2021   ,   Lab Results   Component Value Date    TRIG 106 07/16/2021        Lab Results   Component Value Date    GLUCOSE 83 07/16/2021   , No results found for: \"HGBA1C\"      Lab Results   Component Value Date    CREATININE 0.73 07/16/2021       Lab Results   Component Value Date    TSH 2.67 07/16/2021         No results found for: \"HGBA1C\"  "

## 2025-04-29 RX ORDER — DOXEPIN HYDROCHLORIDE 10 MG/1
CAPSULE ORAL
Qty: 90 CAPSULE | Refills: 0 | Status: SHIPPED | OUTPATIENT
Start: 2025-04-29

## 2025-08-01 RX ORDER — DOXEPIN HYDROCHLORIDE 10 MG/1
CAPSULE ORAL
Qty: 90 CAPSULE | Refills: 0 | Status: SHIPPED | OUTPATIENT
Start: 2025-08-01

## 2025-08-01 NOTE — TELEPHONE ENCOUNTER
"Medicine Refill Request    Last Office Visit: 8/1/2024   Last Consult Visit: Visit date not found  Last Telemedicine Visit: 10/3/2024 Belén Moreira, DO    Next Appointment: Visit date not found      Current Outpatient Medications:     buPROPion XL (WELLBUTRIN XL) 150 mg 24 hr tablet, Take 1 tablet (150 mg total) by mouth daily for 7 days, THEN 2 tablets (300 mg total) daily for 23 days., Disp: 53 tablet, Rfl: 0    doxepin (SINEquan) 10 mg capsule, TAKE 1 CAPSULE BY MOUTH NIGHTLY, Disp: 90 capsule, Rfl: 0    ibuprofen (MOTRIN) 600 mg tablet, TAKE 1 TABLET (600MG TOTAL) BY MOUTH EVERY 8 HOURS AS NEEDED FOR MILD PAIN, Disp: , Rfl: 0    BP Readings from Last 3 Encounters:   08/01/24 136/82   04/25/23 118/88   05/17/22 120/72       Recent Lab results:  Lab Results   Component Value Date    CHOL 168 07/16/2021   ,   Lab Results   Component Value Date    HDL 56 07/16/2021   ,   Lab Results   Component Value Date    LDLCALC 92 07/16/2021   ,   Lab Results   Component Value Date    TRIG 106 07/16/2021        Lab Results   Component Value Date    GLUCOSE 83 07/16/2021   , No results found for: \"HGBA1C\"      Lab Results   Component Value Date    CREATININE 0.73 07/16/2021       Lab Results   Component Value Date    TSH 2.67 07/16/2021         No results found for: \"HGBA1C\"  "